# Patient Record
Sex: MALE | Race: BLACK OR AFRICAN AMERICAN | NOT HISPANIC OR LATINO | Employment: OTHER | URBAN - METROPOLITAN AREA
[De-identification: names, ages, dates, MRNs, and addresses within clinical notes are randomized per-mention and may not be internally consistent; named-entity substitution may affect disease eponyms.]

---

## 2023-04-06 ENCOUNTER — APPOINTMENT (OUTPATIENT)
Dept: PRIMARY CARE | Facility: CLINIC | Age: 73
End: 2023-04-06
Payer: MEDICARE

## 2023-04-11 DIAGNOSIS — E78.00 PURE HYPERCHOLESTEROLEMIA: ICD-10-CM

## 2023-04-11 DIAGNOSIS — I47.19 ATRIAL TACHYCARDIA (CMS-HCC): ICD-10-CM

## 2023-04-11 DIAGNOSIS — E11.9 TYPE 2 DIABETES MELLITUS WITHOUT COMPLICATION, WITHOUT LONG-TERM CURRENT USE OF INSULIN (MULTI): Primary | ICD-10-CM

## 2023-04-11 DIAGNOSIS — I10 BENIGN ESSENTIAL HYPERTENSION: ICD-10-CM

## 2023-04-12 PROBLEM — J45.909 ASTHMA (HHS-HCC): Status: ACTIVE | Noted: 2023-04-12

## 2023-04-12 PROBLEM — J44.9 COPD (CHRONIC OBSTRUCTIVE PULMONARY DISEASE) (MULTI): Status: ACTIVE | Noted: 2023-04-12

## 2023-04-12 PROBLEM — I10 BENIGN ESSENTIAL HYPERTENSION: Status: ACTIVE | Noted: 2023-04-12

## 2023-04-12 PROBLEM — R06.09 DOE (DYSPNEA ON EXERTION): Status: ACTIVE | Noted: 2023-04-12

## 2023-04-12 PROBLEM — R06.9 ABNORMAL RESPIRATIONS: Status: ACTIVE | Noted: 2023-04-12

## 2023-04-12 PROBLEM — I48.91 NEW ONSET ATRIAL FIBRILLATION (MULTI): Status: ACTIVE | Noted: 2023-04-12

## 2023-04-12 PROBLEM — R10.30 LOWER ABDOMINAL PAIN: Status: ACTIVE | Noted: 2023-04-12

## 2023-04-12 PROBLEM — R53.1 WEAKNESS GENERALIZED: Status: ACTIVE | Noted: 2023-04-12

## 2023-04-12 PROBLEM — M21.371 RIGHT FOOT DROP: Status: ACTIVE | Noted: 2023-04-12

## 2023-04-12 PROBLEM — I73.9 PERIPHERAL VASCULAR DISEASE (CMS-HCC): Status: ACTIVE | Noted: 2023-04-12

## 2023-04-12 PROBLEM — I47.10 PAROXYSMAL SVT (SUPRAVENTRICULAR TACHYCARDIA) (CMS-HCC): Status: ACTIVE | Noted: 2023-04-12

## 2023-04-12 PROBLEM — R41.3 MEMORY LOSS: Status: ACTIVE | Noted: 2023-04-12

## 2023-04-12 PROBLEM — I47.19 ATRIAL TACHYCARDIA (CMS-HCC): Status: ACTIVE | Noted: 2023-04-12

## 2023-04-12 PROBLEM — F39 MOOD DISORDER (CMS-HCC): Status: ACTIVE | Noted: 2023-04-12

## 2023-04-12 PROBLEM — L84 CALLUS OF FOOT: Status: ACTIVE | Noted: 2023-04-12

## 2023-04-12 PROBLEM — E78.00 PURE HYPERCHOLESTEROLEMIA: Status: ACTIVE | Noted: 2023-04-12

## 2023-04-12 PROBLEM — D64.9 ANEMIA: Status: ACTIVE | Noted: 2023-04-12

## 2023-04-12 PROBLEM — I48.0 PAROXYSMAL ATRIAL FIBRILLATION (MULTI): Status: ACTIVE | Noted: 2023-04-12

## 2023-04-12 PROBLEM — R79.89 D-DIMER, ELEVATED: Status: ACTIVE | Noted: 2023-04-12

## 2023-04-12 PROBLEM — F32.A DEPRESSION: Status: ACTIVE | Noted: 2023-04-12

## 2023-04-12 PROBLEM — R41.3 MEMORY LOSS, SHORT TERM: Status: ACTIVE | Noted: 2023-04-12

## 2023-04-12 PROBLEM — E11.9 DIABETES MELLITUS (MULTI): Status: ACTIVE | Noted: 2023-04-12

## 2023-04-12 PROBLEM — G31.84 MILD COGNITIVE IMPAIRMENT: Status: ACTIVE | Noted: 2023-04-12

## 2023-04-12 PROBLEM — R93.89 ABNORMAL CHEST CT: Status: ACTIVE | Noted: 2023-04-12

## 2023-04-12 PROBLEM — R41.89 COGNITIVE CHANGES: Status: ACTIVE | Noted: 2023-04-12

## 2023-04-12 PROBLEM — R05.3 CHRONIC COUGH: Status: ACTIVE | Noted: 2023-04-12

## 2023-04-12 PROBLEM — N52.9 ORGANIC IMPOTENCE: Status: ACTIVE | Noted: 2023-04-12

## 2023-04-12 PROBLEM — U07.1 COVID-19 VIRUS INFECTION: Status: ACTIVE | Noted: 2023-04-12

## 2023-04-12 RX ORDER — IBUPROFEN 200 MG
CAPSULE ORAL
COMMUNITY
Start: 2022-02-03 | End: 2023-10-10 | Stop reason: SDUPTHER

## 2023-04-12 RX ORDER — METOPROLOL SUCCINATE 25 MG/1
1 TABLET, EXTENDED RELEASE ORAL DAILY
COMMUNITY
Start: 2017-11-04 | End: 2023-04-13 | Stop reason: SDUPTHER

## 2023-04-12 RX ORDER — LISINOPRIL AND HYDROCHLOROTHIAZIDE 12.5; 2 MG/1; MG/1
1 TABLET ORAL DAILY
COMMUNITY
Start: 2012-12-20 | End: 2023-04-13 | Stop reason: SDUPTHER

## 2023-04-12 RX ORDER — INSULIN LISPRO 100 [IU]/ML
INJECTION, SOLUTION INTRAVENOUS; SUBCUTANEOUS
COMMUNITY
Start: 2022-02-03

## 2023-04-12 RX ORDER — GLIMEPIRIDE 1 MG/1
1 TABLET ORAL DAILY
COMMUNITY
Start: 2012-12-20 | End: 2023-04-13 | Stop reason: SDUPTHER

## 2023-04-12 RX ORDER — CLOPIDOGREL BISULFATE 75 MG/1
1 TABLET ORAL DAILY
COMMUNITY
Start: 2012-12-20 | End: 2023-04-13 | Stop reason: SDUPTHER

## 2023-04-12 RX ORDER — ATORVASTATIN CALCIUM 40 MG/1
1 TABLET, FILM COATED ORAL NIGHTLY
COMMUNITY
Start: 2016-10-14 | End: 2023-04-13 | Stop reason: SDUPTHER

## 2023-04-12 RX ORDER — ALBUTEROL SULFATE 90 UG/1
AEROSOL, METERED RESPIRATORY (INHALATION)
COMMUNITY
Start: 2022-02-03 | End: 2023-10-10 | Stop reason: SDUPTHER

## 2023-04-12 RX ORDER — TIOTROPIUM BROMIDE INHALATION SPRAY 1.56 UG/1
SPRAY, METERED RESPIRATORY (INHALATION)
COMMUNITY
Start: 2022-02-17 | End: 2023-10-10 | Stop reason: SDUPTHER

## 2023-04-13 RX ORDER — METOPROLOL SUCCINATE 25 MG/1
TABLET, EXTENDED RELEASE ORAL
Qty: 90 TABLET | Refills: 3 | Status: SHIPPED | OUTPATIENT
Start: 2023-04-13 | End: 2023-10-10 | Stop reason: SDUPTHER

## 2023-04-13 RX ORDER — GLIMEPIRIDE 1 MG/1
TABLET ORAL
Qty: 90 TABLET | Refills: 3 | Status: SHIPPED | OUTPATIENT
Start: 2023-04-13 | End: 2023-10-10 | Stop reason: SDUPTHER

## 2023-04-13 RX ORDER — LISINOPRIL AND HYDROCHLOROTHIAZIDE 12.5; 2 MG/1; MG/1
TABLET ORAL
Qty: 90 TABLET | Refills: 3 | Status: SHIPPED | OUTPATIENT
Start: 2023-04-13 | End: 2023-10-10 | Stop reason: SDUPTHER

## 2023-04-13 RX ORDER — ATORVASTATIN CALCIUM 40 MG/1
TABLET, FILM COATED ORAL
Qty: 90 TABLET | Refills: 3 | Status: SHIPPED | OUTPATIENT
Start: 2023-04-13 | End: 2023-10-10 | Stop reason: SDUPTHER

## 2023-04-13 RX ORDER — CLOPIDOGREL BISULFATE 75 MG/1
TABLET ORAL
Qty: 90 TABLET | Refills: 3 | Status: SHIPPED | OUTPATIENT
Start: 2023-04-13 | End: 2023-10-10 | Stop reason: SDUPTHER

## 2023-04-20 ENCOUNTER — OFFICE VISIT (OUTPATIENT)
Dept: PRIMARY CARE | Facility: CLINIC | Age: 73
End: 2023-04-20
Payer: MEDICARE

## 2023-04-20 ENCOUNTER — LAB (OUTPATIENT)
Dept: LAB | Facility: LAB | Age: 73
End: 2023-04-20
Payer: MEDICARE

## 2023-04-20 VITALS — WEIGHT: 228 LBS | BODY MASS INDEX: 30.92 KG/M2

## 2023-04-20 DIAGNOSIS — E11.9 TYPE 2 DIABETES MELLITUS WITHOUT COMPLICATION, WITHOUT LONG-TERM CURRENT USE OF INSULIN (MULTI): ICD-10-CM

## 2023-04-20 DIAGNOSIS — J43.9 PULMONARY EMPHYSEMA, UNSPECIFIED EMPHYSEMA TYPE (MULTI): ICD-10-CM

## 2023-04-20 DIAGNOSIS — I10 BENIGN ESSENTIAL HYPERTENSION: ICD-10-CM

## 2023-04-20 DIAGNOSIS — E78.00 PURE HYPERCHOLESTEROLEMIA: ICD-10-CM

## 2023-04-20 DIAGNOSIS — E11.9 TYPE 2 DIABETES MELLITUS WITHOUT COMPLICATION, WITHOUT LONG-TERM CURRENT USE OF INSULIN (MULTI): Primary | ICD-10-CM

## 2023-04-20 LAB
ALBUMIN (G/DL) IN SER/PLAS: 3.8 G/DL (ref 3.4–5)
ANION GAP IN SER/PLAS: 13 MMOL/L (ref 10–20)
CALCIUM (MG/DL) IN SER/PLAS: 9.2 MG/DL (ref 8.6–10.6)
CARBON DIOXIDE, TOTAL (MMOL/L) IN SER/PLAS: 22 MMOL/L (ref 21–32)
CHLORIDE (MMOL/L) IN SER/PLAS: 106 MMOL/L (ref 98–107)
CHOLESTEROL (MG/DL) IN SER/PLAS: 266 MG/DL (ref 0–199)
CHOLESTEROL IN HDL (MG/DL) IN SER/PLAS: 46.6 MG/DL
CHOLESTEROL/HDL RATIO: 5.7
CREATININE (MG/DL) IN SER/PLAS: 1.39 MG/DL (ref 0.5–1.3)
ERYTHROCYTE DISTRIBUTION WIDTH (RATIO) BY AUTOMATED COUNT: 15 % (ref 11.5–14.5)
ERYTHROCYTE MEAN CORPUSCULAR HEMOGLOBIN CONCENTRATION (G/DL) BY AUTOMATED: 33.8 G/DL (ref 32–36)
ERYTHROCYTE MEAN CORPUSCULAR VOLUME (FL) BY AUTOMATED COUNT: 87 FL (ref 80–100)
ERYTHROCYTES (10*6/UL) IN BLOOD BY AUTOMATED COUNT: 5.19 X10E12/L (ref 4.5–5.9)
ESTIMATED AVERAGE GLUCOSE FOR HBA1C: 146 MG/DL
GFR MALE: 54 ML/MIN/1.73M2
GLUCOSE (MG/DL) IN SER/PLAS: 138 MG/DL (ref 74–99)
HEMATOCRIT (%) IN BLOOD BY AUTOMATED COUNT: 45 % (ref 41–52)
HEMOGLOBIN (G/DL) IN BLOOD: 15.2 G/DL (ref 13.5–17.5)
HEMOGLOBIN A1C/HEMOGLOBIN TOTAL IN BLOOD: 6.7 %
LDL: 198 MG/DL (ref 0–99)
LEUKOCYTES (10*3/UL) IN BLOOD BY AUTOMATED COUNT: 6.4 X10E9/L (ref 4.4–11.3)
MAGNESIUM (MG/DL) IN SER/PLAS: 1.9 MG/DL (ref 1.6–2.4)
NRBC (PER 100 WBCS) BY AUTOMATED COUNT: 0 /100 WBC (ref 0–0)
PHOSPHATE (MG/DL) IN SER/PLAS: 2.7 MG/DL (ref 2.5–4.9)
PLATELETS (10*3/UL) IN BLOOD AUTOMATED COUNT: 269 X10E9/L (ref 150–450)
POTASSIUM (MMOL/L) IN SER/PLAS: 4.4 MMOL/L (ref 3.5–5.3)
SODIUM (MMOL/L) IN SER/PLAS: 137 MMOL/L (ref 136–145)
THYROTROPIN (MIU/L) IN SER/PLAS BY DETECTION LIMIT <= 0.05 MIU/L: 1.24 MIU/L (ref 0.44–3.98)
TRIGLYCERIDE (MG/DL) IN SER/PLAS: 109 MG/DL (ref 0–149)
UREA NITROGEN (MG/DL) IN SER/PLAS: 15 MG/DL (ref 6–23)
VLDL: 22 MG/DL (ref 0–40)

## 2023-04-20 PROCEDURE — 99214 OFFICE O/P EST MOD 30 MIN: CPT | Performed by: INTERNAL MEDICINE

## 2023-04-20 PROCEDURE — 36415 COLL VENOUS BLD VENIPUNCTURE: CPT

## 2023-04-20 PROCEDURE — 80061 LIPID PANEL: CPT

## 2023-04-20 PROCEDURE — 80069 RENAL FUNCTION PANEL: CPT

## 2023-04-20 PROCEDURE — 83036 HEMOGLOBIN GLYCOSYLATED A1C: CPT

## 2023-04-20 PROCEDURE — 83735 ASSAY OF MAGNESIUM: CPT

## 2023-04-20 PROCEDURE — 85027 COMPLETE CBC AUTOMATED: CPT

## 2023-04-20 PROCEDURE — 84443 ASSAY THYROID STIM HORMONE: CPT

## 2023-04-20 ASSESSMENT — ENCOUNTER SYMPTOMS: SHORTNESS OF BREATH: 1

## 2023-04-20 NOTE — ASSESSMENT & PLAN NOTE
#IDDM-II  - A1c pending, last 6.8%  - Instructed patient to continue insulin lispro and glimepiride 1 mg tab daily

## 2023-04-20 NOTE — PROGRESS NOTES
Subjective   Patient ID: Brendan Rob is a 72 y.o. male who presents for Follow-up.    Brendan Rob is a 71 y/o AA M with PMHx significant for HTN, COPD, Atrial Fibrillation, NIDDM-II, HLD, and Mild Cognitive Impairment who is presenting for follow-up visit. Patient reports feeling SOB at times when going for walks but has not had to use his rescue inhaler more than usual (1-3x per day). He at times forgets to take his blood pressure medications, but wife who was at bedside says it does not happen often. Patient states to feel well and has not had any illnesses or issues aside from SOB. He denies HA, vision changes, dizziness, falls, CP, N/V/D, fever, chills, weight changes.         Review of Systems   Respiratory:  Positive for shortness of breath.    All other systems reviewed and are negative.      Objective   Wt 103 kg (228 lb)   BMI 30.92 kg/m²     Physical Exam  Vitals reviewed.   Constitutional:       Appearance: Normal appearance. He is normal weight.   HENT:      Head: Normocephalic and atraumatic.      Mouth/Throat:      Mouth: Mucous membranes are moist.   Eyes:      Extraocular Movements: Extraocular movements intact.      Conjunctiva/sclera: Conjunctivae normal.      Pupils: Pupils are equal, round, and reactive to light.   Cardiovascular:      Rate and Rhythm: Normal rate and regular rhythm.   Pulmonary:      Effort: Pulmonary effort is normal.      Breath sounds: Normal breath sounds.   Abdominal:      General: Bowel sounds are normal.      Palpations: Abdomen is soft.   Musculoskeletal:         General: Normal range of motion.   Skin:     General: Skin is warm.   Neurological:      General: No focal deficit present.      Mental Status: He is alert.   Psychiatric:         Mood and Affect: Mood normal.         Behavior: Behavior normal.         Thought Content: Thought content normal.         Judgment: Judgment normal.         Assessment/Plan   Problem List Items Addressed This Visit           Respiratory    COPD (chronic obstructive pulmonary disease) (CMS/[facility])     #COPD  - Continue albuterol MDI PRN + Spiriva            Circulatory    Benign essential hypertension     #HTN  - /76 on manual BP, he had not taken his BP meds  - Continue Metoprolol succinate 25 mg tab daily  - Continue Lisinopril-hydrochlorothiazide 20-12.5 mg tab daily         Relevant Orders    Renal function panel    Magnesium    CBC    Tsh With Reflex To Free T4 If Abnormal       Endocrine/Metabolic    Diabetes mellitus (CMS/[facility]) - Primary     #IDDM-II  - A1c pending, last 6.8%  - Instructed patient to continue insulin lispro and glimepiride 1 mg tab daily         Relevant Orders    Hemoglobin A1c    Renal function panel       Other    Pure hypercholesterolemia     #HLD  - Lipid panel pending  - Continue atorvastatin 40 mg tab         Relevant Orders    Lipid panel

## 2023-04-20 NOTE — ASSESSMENT & PLAN NOTE
#HTN  - /76 on manual BP, he had not taken his BP meds  - Continue Metoprolol succinate 25 mg tab daily  - Continue Lisinopril-hydrochlorothiazide 20-12.5 mg tab daily

## 2023-09-20 PROBLEM — J96.10 CHRONIC RESPIRATORY FAILURE (MULTI): Status: ACTIVE | Noted: 2023-09-20

## 2023-09-28 ENCOUNTER — APPOINTMENT (OUTPATIENT)
Dept: PRIMARY CARE | Facility: CLINIC | Age: 73
End: 2023-09-28
Payer: MEDICARE

## 2023-10-10 ENCOUNTER — LAB (OUTPATIENT)
Dept: LAB | Facility: LAB | Age: 73
End: 2023-10-10
Payer: MEDICARE

## 2023-10-10 ENCOUNTER — OFFICE VISIT (OUTPATIENT)
Dept: PRIMARY CARE | Facility: CLINIC | Age: 73
End: 2023-10-10
Payer: MEDICARE

## 2023-10-10 VITALS — DIASTOLIC BLOOD PRESSURE: 76 MMHG | SYSTOLIC BLOOD PRESSURE: 130 MMHG | WEIGHT: 219 LBS | BODY MASS INDEX: 29.7 KG/M2

## 2023-10-10 DIAGNOSIS — E11.9 TYPE 2 DIABETES MELLITUS WITHOUT COMPLICATION, WITHOUT LONG-TERM CURRENT USE OF INSULIN (MULTI): ICD-10-CM

## 2023-10-10 DIAGNOSIS — E78.00 PURE HYPERCHOLESTEROLEMIA: ICD-10-CM

## 2023-10-10 DIAGNOSIS — I10 BENIGN ESSENTIAL HYPERTENSION: ICD-10-CM

## 2023-10-10 DIAGNOSIS — I47.19 ATRIAL TACHYCARDIA (CMS-HCC): ICD-10-CM

## 2023-10-10 DIAGNOSIS — Z00.00 ROUTINE MEDICAL EXAM: Primary | ICD-10-CM

## 2023-10-10 DIAGNOSIS — R41.3 MEMORY CHANGE: ICD-10-CM

## 2023-10-10 DIAGNOSIS — J42 CHRONIC BRONCHITIS, UNSPECIFIED CHRONIC BRONCHITIS TYPE (MULTI): ICD-10-CM

## 2023-10-10 LAB
ALBUMIN SERPL BCP-MCNC: 4.1 G/DL (ref 3.4–5)
ALP SERPL-CCNC: 73 U/L (ref 33–136)
ALT SERPL W P-5'-P-CCNC: 15 U/L (ref 10–52)
ANION GAP SERPL CALC-SCNC: 16 MMOL/L (ref 10–20)
AST SERPL W P-5'-P-CCNC: 23 U/L (ref 9–39)
BILIRUB SERPL-MCNC: 0.7 MG/DL (ref 0–1.2)
BUN SERPL-MCNC: 22 MG/DL (ref 6–23)
CALCIUM SERPL-MCNC: 9.2 MG/DL (ref 8.6–10.6)
CHLORIDE SERPL-SCNC: 109 MMOL/L (ref 98–107)
CO2 SERPL-SCNC: 21 MMOL/L (ref 21–32)
CREAT SERPL-MCNC: 1.71 MG/DL (ref 0.5–1.3)
ERYTHROCYTE [DISTWIDTH] IN BLOOD BY AUTOMATED COUNT: 14.9 % (ref 11.5–14.5)
GFR SERPL CREATININE-BSD FRML MDRD: 42 ML/MIN/1.73M*2
GLUCOSE SERPL-MCNC: 93 MG/DL (ref 74–99)
HCT VFR BLD AUTO: 42.5 % (ref 41–52)
HGB BLD-MCNC: 14.2 G/DL (ref 13.5–17.5)
MCH RBC QN AUTO: 30 PG (ref 26–34)
MCHC RBC AUTO-ENTMCNC: 33.4 G/DL (ref 32–36)
MCV RBC AUTO: 90 FL (ref 80–100)
NRBC BLD-RTO: 0 /100 WBCS (ref 0–0)
PLATELET # BLD AUTO: 311 X10*3/UL (ref 150–450)
PMV BLD AUTO: 10.8 FL (ref 7.5–11.5)
POTASSIUM SERPL-SCNC: 5 MMOL/L (ref 3.5–5.3)
PROT SERPL-MCNC: 6.6 G/DL (ref 6.4–8.2)
RBC # BLD AUTO: 4.74 X10*6/UL (ref 4.5–5.9)
SODIUM SERPL-SCNC: 141 MMOL/L (ref 136–145)
WBC # BLD AUTO: 9 X10*3/UL (ref 4.4–11.3)

## 2023-10-10 PROCEDURE — 1170F FXNL STATUS ASSESSED: CPT | Performed by: INTERNAL MEDICINE

## 2023-10-10 PROCEDURE — 1126F AMNT PAIN NOTED NONE PRSNT: CPT | Performed by: INTERNAL MEDICINE

## 2023-10-10 PROCEDURE — 99214 OFFICE O/P EST MOD 30 MIN: CPT | Performed by: INTERNAL MEDICINE

## 2023-10-10 PROCEDURE — G0439 PPPS, SUBSEQ VISIT: HCPCS | Performed by: INTERNAL MEDICINE

## 2023-10-10 PROCEDURE — 1159F MED LIST DOCD IN RCRD: CPT | Performed by: INTERNAL MEDICINE

## 2023-10-10 PROCEDURE — 1160F RVW MEDS BY RX/DR IN RCRD: CPT | Performed by: INTERNAL MEDICINE

## 2023-10-10 PROCEDURE — 1036F TOBACCO NON-USER: CPT | Performed by: INTERNAL MEDICINE

## 2023-10-10 PROCEDURE — 36415 COLL VENOUS BLD VENIPUNCTURE: CPT

## 2023-10-10 PROCEDURE — 85027 COMPLETE CBC AUTOMATED: CPT

## 2023-10-10 PROCEDURE — 3075F SYST BP GE 130 - 139MM HG: CPT | Performed by: INTERNAL MEDICINE

## 2023-10-10 PROCEDURE — 80053 COMPREHEN METABOLIC PANEL: CPT

## 2023-10-10 PROCEDURE — 3044F HG A1C LEVEL LT 7.0%: CPT | Performed by: INTERNAL MEDICINE

## 2023-10-10 PROCEDURE — 3078F DIAST BP <80 MM HG: CPT | Performed by: INTERNAL MEDICINE

## 2023-10-10 RX ORDER — TIOTROPIUM BROMIDE INHALATION SPRAY 1.56 UG/1
1 SPRAY, METERED RESPIRATORY (INHALATION) 2 TIMES DAILY
Qty: 1 EACH | Refills: 2 | Status: SHIPPED | OUTPATIENT
Start: 2023-10-10 | End: 2024-03-18 | Stop reason: WASHOUT

## 2023-10-10 RX ORDER — CLOPIDOGREL BISULFATE 75 MG/1
75 TABLET ORAL DAILY
Qty: 30 TABLET | Refills: 0 | Status: SHIPPED | OUTPATIENT
Start: 2023-10-10 | End: 2023-10-31 | Stop reason: SDUPTHER

## 2023-10-10 RX ORDER — GLIMEPIRIDE 1 MG/1
1 TABLET ORAL DAILY
Qty: 90 TABLET | Refills: 3 | Status: SHIPPED | OUTPATIENT
Start: 2023-10-10

## 2023-10-10 RX ORDER — ALBUTEROL SULFATE 90 UG/1
2 AEROSOL, METERED RESPIRATORY (INHALATION) EVERY 6 HOURS PRN
Qty: 18 G | Refills: 3 | Status: SHIPPED | OUTPATIENT
Start: 2023-10-10 | End: 2024-03-01 | Stop reason: SDUPTHER

## 2023-10-10 RX ORDER — METOPROLOL SUCCINATE 25 MG/1
25 TABLET, EXTENDED RELEASE ORAL DAILY
Qty: 90 TABLET | Refills: 3 | Status: SHIPPED | OUTPATIENT
Start: 2023-10-10

## 2023-10-10 RX ORDER — LISINOPRIL AND HYDROCHLOROTHIAZIDE 12.5; 2 MG/1; MG/1
1 TABLET ORAL DAILY
Qty: 90 TABLET | Refills: 3 | Status: SHIPPED | OUTPATIENT
Start: 2023-10-10

## 2023-10-10 RX ORDER — ATORVASTATIN CALCIUM 40 MG/1
40 TABLET, FILM COATED ORAL NIGHTLY
Qty: 90 TABLET | Refills: 3 | Status: SHIPPED | OUTPATIENT
Start: 2023-10-10 | End: 2024-04-23 | Stop reason: SDUPTHER

## 2023-10-10 RX ORDER — IBUPROFEN 200 MG
CAPSULE ORAL
Qty: 30 STRIP | Refills: 2 | Status: SHIPPED | OUTPATIENT
Start: 2023-10-10

## 2023-10-10 ASSESSMENT — PATIENT HEALTH QUESTIONNAIRE - PHQ9
1. LITTLE INTEREST OR PLEASURE IN DOING THINGS: NOT AT ALL
2. FEELING DOWN, DEPRESSED OR HOPELESS: NOT AT ALL
SUM OF ALL RESPONSES TO PHQ9 QUESTIONS 1 AND 2: 0

## 2023-10-10 ASSESSMENT — ACTIVITIES OF DAILY LIVING (ADL)
DOING_HOUSEWORK: INDEPENDENT
TAKING_MEDICATION: INDEPENDENT
DRESSING: INDEPENDENT
MANAGING_FINANCES: INDEPENDENT
GROCERY_SHOPPING: INDEPENDENT
BATHING: INDEPENDENT

## 2023-10-10 ASSESSMENT — ENCOUNTER SYMPTOMS
DEPRESSION: 0
OCCASIONAL FEELINGS OF UNSTEADINESS: 0
LOSS OF SENSATION IN FEET: 0

## 2023-10-10 ASSESSMENT — PAIN SCALES - GENERAL: PAINLEVEL: 0-NO PAIN

## 2023-10-10 NOTE — PROGRESS NOTES
Primary care office Note    Subjective:   Chief complain:   Chief Complaint   Patient presents with    Medicare Annual Wellness Visit Subsequent     Refills optum        Brendan Rob is a 72 y.o. male who presented to the clinic today for routine follow up visit and medicare wellness visit.   Medicare Wellness Visit Billing Compliance Not Met    *This is a visual tool to show completion of required items on the day of the visit. Green checks will only appear on the date of visit.    Review all medications by prescribing practitioner or clinical pharmacist (such as prescriptions, OTCs, herbal therapies and supplements) documented in the medical record     Past Medical, Surgical, and Family History reviewed and updated in chart    Tobacco Use Reviewed    Alcohol Use Reviewed    Illicit Drug Use Reviewed    PHQ2/9     Falls in Last Year Reviewed    Home Safety Risk Factors Reviewed     Cognitive Impairment Reviewed     Patient Self Assessment and Health Status     Current Diet Reviewed     Exercise Frequency     ADL - Hearing Impairment     ADL - Bathing     ADL - Dressing     ADL - Walks in Home     IADL - Managing Finances     IADL - Grocery Shopping     IADL - Taking Medications     IADL - Doing Housework       HPI:   Brendan Rob  is a 72 y.o. male with a medical of COPD, HTN, DM, HLD who presented to the clinic today for a routine follow up visit and medicare wellness visit.    He has no new complains today.     The patient denies headaches, lightheadedness, changes in speech/vision, photophobia, dysphagia, diaphoresis, Chest pain, dyspnea, Abdominal pain, recent falls or trauma, and changes in bowel/urinary habits.     ROS:   12 points review of system is negative excepted as stated above in the HPI.    PMH:    has a past medical history of Abnormal weight loss (04/25/2015), Acute upper respiratory infection, unspecified (01/04/2014), Encounter for immunization (09/18/2020), Encounter for screening  for malignant neoplasm of prostate (04/25/2015), Encounter for screening for malignant neoplasm of prostate (06/29/2013), Personal history of other diseases of urinary system (06/29/2013), and Personal history of other endocrine, nutritional and metabolic disease.     Allergies: No Known Allergies     MEDS:   Current Outpatient Medications   Medication Instructions    albuterol 90 mcg/actuation inhaler 2 puffs, inhalation, Every 6 hours PRN    atorvastatin (LIPITOR) 40 mg, oral, Nightly    blood sugar diagnostic (Blood Glucose Test) strip USE AS DIRECTED.    clopidogrel (PLAVIX) 75 mg, oral, Daily    glimepiride (AMARYL) 1 mg, oral, Daily    insulin lispro (HumaLOG) 100 unit/mL injection injectable 3 times a day    lisinopriL-hydrochlorothiazide 20-12.5 mg tablet 1 tablet, oral, Daily    metoprolol succinate XL (TOPROL-XL) 25 mg, oral, Daily, Do not crush or chew.    tiotropium (Spiriva Respimat) 1.25 mcg/actuation inhaler 1 puff, inhalation, 2 times daily        Surgical Hx:   Past Surgical History:   Procedure Laterality Date    COLONOSCOPY  03/08/2013    Complete Colonoscopy    OTHER SURGICAL HISTORY  11/01/2019    Aortobifemoral bypass        Objective:   Vital Signs:   Visit Vitals  /76        Physical Examination:   GE; sitting comfortably in a chair, in NAD  HEENT; AT/NC, no conjunctival pallor, anicteric sclera  Neck; Supple neck, no LAD/JVD  Chest; CTAbl, no adventitious sounds  CVS; s1 and s2 only no MGR, RRR  Abd; soft, NT/ND, normoactive BS  Ext; no cyanosis/clubbing/edema, pulses intact  Neuro; Aox3  Psych; normal mood     Labs:   CBC:   WBC   Date Value Ref Range Status   10/10/2023 9.0 4.4 - 11.3 x10*3/uL Final   04/20/2023 6.4 4.4 - 11.3 x10E9/L Final   12/22/2022 8.2 4.4 - 11.3 x10E9/L Final     Hemoglobin   Date Value Ref Range Status   10/10/2023 14.2 13.5 - 17.5 g/dL Final   04/20/2023 15.2 13.5 - 17.5 g/dL Final   12/22/2022 15.5 13.5 - 17.5 g/dL Final     MCV   Date Value Ref Range Status    10/10/2023 90 80 - 100 fL Final   04/20/2023 87 80 - 100 fL Final   12/22/2022 85 80 - 100 fL Final     Platelets   Date Value Ref Range Status   10/10/2023 311 150 - 450 x10*3/uL Final   04/20/2023 269 150 - 450 x10E9/L Final   12/22/2022 281 150 - 450 x10E9/L Final        CMP:   Sodium   Date Value Ref Range Status   10/10/2023 141 136 - 145 mmol/L Final   04/20/2023 137 136 - 145 mmol/L Final   12/22/2022 135 (L) 136 - 145 mmol/L Final     Potassium   Date Value Ref Range Status   10/10/2023 5.0 3.5 - 5.3 mmol/L Final   04/20/2023 4.4 3.5 - 5.3 mmol/L Final   12/22/2022 4.4 3.5 - 5.3 mmol/L Final     Chloride   Date Value Ref Range Status   10/10/2023 109 (H) 98 - 107 mmol/L Final   04/20/2023 106 98 - 107 mmol/L Final   12/22/2022 104 98 - 107 mmol/L Final     Urea Nitrogen   Date Value Ref Range Status   10/10/2023 22 6 - 23 mg/dL Final   04/20/2023 15 6 - 23 mg/dL Final   12/22/2022 15 6 - 23 mg/dL Final     Creatinine   Date Value Ref Range Status   10/10/2023 1.71 (H) 0.50 - 1.30 mg/dL Final   04/20/2023 1.39 (H) 0.50 - 1.30 mg/dL Final   12/22/2022 1.35 (H) 0.50 - 1.30 mg/dL Final     eGFR   Date Value Ref Range Status   10/10/2023 42 (L) >60 mL/min/1.73m*2 Final     Comment:     Calculations of estimated GFR are performed using the 2021 CKD-EPI Study Refit equation without the race variable for the IDMS-Traceable creatinine methods.  https://jasn.asnjournals.org/content/early/2021/09/22/ASN.5147344263      A1c:   Hemoglobin A1C   Date Value Ref Range Status   04/20/2023 6.7 (A) % Final     Comment:          Diagnosis of Diabetes-Adults   Non-Diabetic: < or = 5.6%   Increased risk for developing diabetes: 5.7-6.4%   Diagnostic of diabetes: > or = 6.5%  .       Monitoring of Diabetes                Age (y)     Therapeutic Goal (%)   Adults:          >18           <7.0   Pediatrics:    13-18           <7.5                   7-12           <8.0                   0- 6            7.5-8.5   American Diabetes  "Association. Diabetes Care 33(S1), Jan 2010.   Hemoglobin variant detected which does not interfere    with determination of Hemoglobin A1c. Hemoglobin   identification can be ordered to characterize the variant   if clinically indicated.   02/01/2022 6.8 (A) % Final     Comment:          Diagnosis of Diabetes-Adults   Non-Diabetic: < or = 5.6%   Increased risk for developing diabetes: 5.7-6.4%   Diagnostic of diabetes: > or = 6.5%  .       Monitoring of Diabetes                Age (y)     Therapeutic Goal (%)   Adults:          >18           <7.0   Pediatrics:    13-18           <7.5                   7-12           <8.0                   0- 6            7.5-8.5   American Diabetes Association. Diabetes Care 33(S1), Jan 2010.   Hemoglobin variant detected which does not interfere    with determination of Hemoglobin A1c. Hemoglobin   identification can be ordered to characterize the variant   if clinically indicated.          Other labs;   Vit D:   No results found for: \"VITD25\"   TSH:  TSH   Date Value Ref Range Status   04/20/2023 1.24 0.44 - 3.98 mIU/L Final     Comment:      TSH testing is performed using different testing    methodology at [facility] than at other    [facility]. Direct result comparisons should    only be made within the same method.   01/11/2022 0.44 0.44 - 3.98 mIU/L Final     Comment:      TSH testing is performed using different testing    methodology at [facility] than at other    [facility]. Direct result comparisons should    only be made within the same method.     07/20/2019 0.86 0.44 - 3.98 mIU/L Final     Comment:      TSH testing is performed using different testing    methodology at [facility] than at other    [facility]. Direct result comparisons should    only be made within the same method.  .   Patients receiving more than 5 mg/day of biotin may have interference   in test results.  A sample should be taken no sooner " than eight hours   after  previous dose. Contact [phone] for additional information.          Assessment and plan:   Problem List Items Addressed This Visit       Atrial tachycardia    Relevant Medications    clopidogrel (Plavix) 75 mg tablet    metoprolol succinate XL (Toprol-XL) 25 mg 24 hr tablet    Benign essential hypertension    Relevant Medications    lisinopriL-hydrochlorothiazide 20-12.5 mg tablet    COPD (chronic obstructive pulmonary disease) (CMS/[facility])    Relevant Medications    albuterol 90 mcg/actuation inhaler    tiotropium (Spiriva Respimat) 1.25 mcg/actuation inhaler    Diabetes mellitus (CMS/[facility])    Relevant Medications    glimepiride (Amaryl) 1 mg tablet    blood sugar diagnostic (Blood Glucose Test) strip    Other Relevant Orders    Comprehensive metabolic panel (Completed)    CBC (Completed)    Pure hypercholesterolemia    Relevant Medications    atorvastatin (Lipitor) 40 mg tablet     Other Visit Diagnoses       Routine medical exam    -  Primary    Memory change        Relevant Orders    Referral to Neurology         The patient is a 72 y.o. male with a medical of COPD, HTN, DM, HLD who presented to the clinic today for a routine follow up visit and a wellness visit.     DM; last A1c 6.7  Home blood sugar measurement  Continue giimepiride   continue insulin lispro    HTN:   BP today  Home measurement:   Advised low salt diet  Enourage to do exercise and wt loss   continue metoprolol suc 25, continue lisinopril hydrochlorothiazide 20/12.5    HLD; continue Lipitor 50 mg daily, continue plavix 75mg daily     COPD continue Albuterol 90 mcg inhaler    Ordered the following test: cbc, cmp, lipids, A1c  - Refilled all medications today    Anna Arana,

## 2023-10-17 NOTE — TELEPHONE ENCOUNTER
----- Message from Anna Arana DO sent at 10/12/2023  5:04 PM EDT -----  Worsening CKD noted - would like to recheck in 1 month. Please have patient scheduled in person or virtual in 1 month. Thank you :)

## 2023-10-25 ENCOUNTER — OFFICE VISIT (OUTPATIENT)
Dept: CARDIOLOGY | Facility: CLINIC | Age: 73
End: 2023-10-25
Payer: MEDICARE

## 2023-10-25 VITALS
WEIGHT: 219.8 LBS | HEART RATE: 67 BPM | HEIGHT: 72 IN | DIASTOLIC BLOOD PRESSURE: 78 MMHG | OXYGEN SATURATION: 97 % | SYSTOLIC BLOOD PRESSURE: 130 MMHG | BODY MASS INDEX: 29.77 KG/M2

## 2023-10-25 DIAGNOSIS — I47.10 PAROXYSMAL SVT (SUPRAVENTRICULAR TACHYCARDIA) (CMS-HCC): ICD-10-CM

## 2023-10-25 DIAGNOSIS — E78.00 PURE HYPERCHOLESTEROLEMIA: ICD-10-CM

## 2023-10-25 DIAGNOSIS — R06.09 DOE (DYSPNEA ON EXERTION): Primary | ICD-10-CM

## 2023-10-25 DIAGNOSIS — I47.19 ATRIAL TACHYCARDIA (CMS-HCC): ICD-10-CM

## 2023-10-25 DIAGNOSIS — I10 BENIGN ESSENTIAL HYPERTENSION: ICD-10-CM

## 2023-10-25 DIAGNOSIS — I48.91 NEW ONSET ATRIAL FIBRILLATION (MULTI): ICD-10-CM

## 2023-10-25 DIAGNOSIS — I73.9 PERIPHERAL VASCULAR DISEASE (CMS-HCC): ICD-10-CM

## 2023-10-25 DIAGNOSIS — I48.0 PAROXYSMAL ATRIAL FIBRILLATION (MULTI): ICD-10-CM

## 2023-10-25 PROCEDURE — 99214 OFFICE O/P EST MOD 30 MIN: CPT | Performed by: STUDENT IN AN ORGANIZED HEALTH CARE EDUCATION/TRAINING PROGRAM

## 2023-10-25 PROCEDURE — 3044F HG A1C LEVEL LT 7.0%: CPT | Performed by: STUDENT IN AN ORGANIZED HEALTH CARE EDUCATION/TRAINING PROGRAM

## 2023-10-25 PROCEDURE — 1036F TOBACCO NON-USER: CPT | Performed by: STUDENT IN AN ORGANIZED HEALTH CARE EDUCATION/TRAINING PROGRAM

## 2023-10-25 PROCEDURE — 1159F MED LIST DOCD IN RCRD: CPT | Performed by: STUDENT IN AN ORGANIZED HEALTH CARE EDUCATION/TRAINING PROGRAM

## 2023-10-25 PROCEDURE — 1126F AMNT PAIN NOTED NONE PRSNT: CPT | Performed by: STUDENT IN AN ORGANIZED HEALTH CARE EDUCATION/TRAINING PROGRAM

## 2023-10-25 PROCEDURE — 3075F SYST BP GE 130 - 139MM HG: CPT | Performed by: STUDENT IN AN ORGANIZED HEALTH CARE EDUCATION/TRAINING PROGRAM

## 2023-10-25 PROCEDURE — 3078F DIAST BP <80 MM HG: CPT | Performed by: STUDENT IN AN ORGANIZED HEALTH CARE EDUCATION/TRAINING PROGRAM

## 2023-10-25 PROCEDURE — 1160F RVW MEDS BY RX/DR IN RCRD: CPT | Performed by: STUDENT IN AN ORGANIZED HEALTH CARE EDUCATION/TRAINING PROGRAM

## 2023-10-25 ASSESSMENT — ENCOUNTER SYMPTOMS
DEPRESSION: 0
OCCASIONAL FEELINGS OF UNSTEADINESS: 0
LOSS OF SENSATION IN FEET: 0

## 2023-10-25 ASSESSMENT — PAIN SCALES - GENERAL: PAINLEVEL: 0-NO PAIN

## 2023-10-25 NOTE — PROGRESS NOTES
Referred by Dr. Lozano ref. provider found for Follow-up     HPI:    Brendan Rob is a 72 y.o. male with pertinent history of hypertension, type 2 diabetes, Covid in January 2022, history of coronary artery disease, paroxysmal atrial fibrillation (mainly in the setting of COVID; previously on Eliquis; event monitor performed June 2022 with 61 episodes of SVT up to 7 beats including atrial tachycardia but no clear A. fib burden), concern for possible right atrial mass on echo performed 1/11/2022, normal ejection fraction with patient terminating exam early making cardiac MRI unable to assess right atrial mass on 1/12/2022, preserved ejection fraction with impaired relaxation without clear right atrial mass on echo performed 5/19/2022 presents to cardiology clinic for follow-up.     Clinically he is doing well and remains active.  Dyspnea on exertion is stable.  No exacerbating or relieving factors.  Patient denies chest pain and angina.  Pt denies orthopnea, and paroxysmal nocturnal dyspnea.  Pt denies worsening lower extremity edema.  Pt denies palpitations or syncope.  No recent falls.  No fever or chills.  No cough.  No change in bowel or bladder habits.  No sick contacts.  No recent travel.    12 point review of systems including (Constitutional, Eyes, ENMT, Respiratory, Cardiac, Gastrointestinal, Neurological, Psychiatric, and Hematologic) was performed and is otherwise negative.    Past medical history reviewed:   has a past medical history of Abnormal weight loss (04/25/2015), Acute upper respiratory infection, unspecified (01/04/2014), Encounter for immunization (09/18/2020), Encounter for screening for malignant neoplasm of prostate (04/25/2015), Encounter for screening for malignant neoplasm of prostate (06/29/2013), Personal history of other diseases of urinary system (06/29/2013), and Personal history of other endocrine, nutritional and metabolic disease.    Past surgical history reviewed:   has a past  surgical history that includes Colonoscopy (03/08/2013) and Other surgical history (11/01/2019).    Social history reviewed:   reports that he quit smoking about 3 years ago. His smoking use included cigarettes. He started smoking about 27 years ago. He has a 25.00 pack-year smoking history. He has been exposed to tobacco smoke. He has never used smokeless tobacco. He reports that he does not currently use alcohol. He reports that he does not currently use drugs.     Family history reviewed:  No family history on file.    Allergies reviewed: Patient has no known allergies.     Medications reviewed:   Current Outpatient Medications   Medication Instructions    albuterol 90 mcg/actuation inhaler 2 puffs, inhalation, Every 6 hours PRN    atorvastatin (LIPITOR) 40 mg, oral, Nightly    blood sugar diagnostic (Blood Glucose Test) strip USE AS DIRECTED.    clopidogrel (PLAVIX) 75 mg, oral, Daily    glimepiride (AMARYL) 1 mg, oral, Daily    insulin lispro (HumaLOG) 100 unit/mL injection injectable 3 times a day    lisinopriL-hydrochlorothiazide 20-12.5 mg tablet 1 tablet, oral, Daily    metoprolol succinate XL (TOPROL-XL) 25 mg, oral, Daily, Do not crush or chew.    tiotropium (Spiriva Respimat) 1.25 mcg/actuation inhaler 1 puff, inhalation, 2 times daily        Vitals reviewed: Visit Vitals  /78   Pulse 67       Physical Exam:   General:  Patient is awake, alert, and oriented.  Patient is in no acute distress.  HEENT:  Pupils equal and reactive.  Normocephalic.  Moist mucosa.    Neck:  No thyromegaly.  Normal Jugular Venous Pressure.  Cardiovascular:  Regular rate and rhythm.  Normal S1 and S2.  1/6 JEOVANY.  Pulmonary:  Clear to auscultation bilaterally.  Abdomen:  Soft. Non-tender.   Non-distended.  Positive bowel sounds.  Lower Extremities:  2+ pedal pulses. No LE edema.  Neurologic:  Cranial nerves intact.  No focal deficit.   Skin: Skin warm and dry, normal skin turgor.   Psychiatric: Normal affect.    Last  Labs:  CBC -      Lab Results   Component Value Date    WBC 9.0 10/10/2023    HGB 14.2 10/10/2023    HCT 42.5 10/10/2023     10/10/2023        CMP-  Lab Results   Component Value Date    GLUCOSE 93 10/10/2023     10/10/2023    K 5.0 10/10/2023     (H) 10/10/2023    CO2 21 10/10/2023    ANIONGAP 16 10/10/2023    BUN 22 10/10/2023    CREATININE 1.71 (H) 10/10/2023    EGFR 42 (L) 10/10/2023    CALCIUM 9.2 10/10/2023    PHOS 2.7 04/20/2023    PROT 6.6 10/10/2023    ALBUMIN 4.1 10/10/2023    AST 23 10/10/2023    ALT 15 10/10/2023    ALKPHOS 73 10/10/2023    BILITOT 0.7 10/10/2023        LIPIDS-  Lab Results   Component Value Date    CHOL 266 (H) 04/20/2023    TRIG 109 04/20/2023    HDL 46.6 04/20/2023    CHHDL 5.7 (A) 04/20/2023    VLDL 22 04/20/2023        OTHERS-  Lab Results   Component Value Date    HGBA1C 6.7 (A) 04/20/2023    BNP 29 02/18/2022        I personally reviewed the patient's recent vitals, labs, medications, orders, EKGs, pertinent cardiac imaging/ echocardiography.    Assessment and Plan:    Brendan Rob is a 72 y.o. male with pertinent history of hypertension, type 2 diabetes, Covid in January 2022, history of coronary artery disease, paroxysmal atrial fibrillation (mainly in the setting of COVID; previously on Eliquis; event monitor performed June 2022 with 61 episodes of SVT up to 7 beats including atrial tachycardia but no clear A. fib burden), concern for possible right atrial mass on echo performed 1/11/2022, normal ejection fraction with patient terminating exam early making cardiac MRI unable to assess right atrial mass on 1/12/2022, preserved ejection fraction with impaired relaxation without clear right atrial mass on echo performed 5/19/2022 presents to cardiology clinic for follow-up. Clinically he is doing well and remains active.  Dyspnea on exertion is stable.     Please continue current cardiac medications including atorvastatin 40 mg, clopidogrel 75 mg daily,  combination lisinopril 20 mg HCTZ 12.5 mg daily, metoprolol succinate 25 mg daily.    We will obtain a transthoracic echocardiogram for structural evaluation including ejection fraction, assessment of regional wall motion abnormalities or valvular disease, and further evaluation of hemodynamics.    Please followup with me in Cardiology clinic within the next 1 year.  Please return to clinic sooner or seek emergent care if your symptoms reoccur or worsen.    Thank you for allowing me to participate in their care.  Please feel free to call me with any further questions or concerns.        Xavier Sánchez MD, FACC, [facility]  Division of Cardiovascular Medicine  Medical Director, [facility]  [facility]  Assistant Clinical Professor, Medicine  [facility] [employer] [employer] [employer]  [email or web address]  Office:  [phone]

## 2023-10-25 NOTE — PATIENT INSTRUCTIONS
Please continue current cardiac medications including atorvastatin 40 mg, clopidogrel 75 mg daily, combination lisinopril 20 mg HCTZ 12.5 mg daily, metoprolol succinate 25 mg daily.    We will obtain a transthoracic echocardiogram for structural evaluation including ejection fraction, assessment of regional wall motion abnormalities or valvular disease, and further evaluation of hemodynamics.    Please followup with me in Cardiology clinic within the next 1 year.  Please return to clinic sooner or seek emergent care if your symptoms reoccur or worsen.

## 2023-10-31 DIAGNOSIS — I47.19 ATRIAL TACHYCARDIA (CMS-HCC): ICD-10-CM

## 2023-10-31 RX ORDER — CLOPIDOGREL BISULFATE 75 MG/1
75 TABLET ORAL DAILY
Qty: 90 TABLET | Refills: 0 | Status: SHIPPED | OUTPATIENT
Start: 2023-10-31 | End: 2023-12-28 | Stop reason: SDUPTHER

## 2023-11-06 ENCOUNTER — TELEPHONE (OUTPATIENT)
Dept: PRIMARY CARE | Facility: CLINIC | Age: 73
End: 2023-11-06
Payer: MEDICARE

## 2023-11-13 ENCOUNTER — TELEPHONE (OUTPATIENT)
Dept: PRIMARY CARE | Facility: CLINIC | Age: 73
End: 2023-11-13
Payer: MEDICARE

## 2023-12-25 DIAGNOSIS — I47.19 ATRIAL TACHYCARDIA (CMS-HCC): ICD-10-CM

## 2023-12-28 RX ORDER — CLOPIDOGREL BISULFATE 75 MG/1
75 TABLET ORAL DAILY
Qty: 90 TABLET | Refills: 1 | Status: SHIPPED | OUTPATIENT
Start: 2023-12-28

## 2024-02-19 ENCOUNTER — APPOINTMENT (OUTPATIENT)
Dept: PULMONOLOGY | Facility: CLINIC | Age: 74
End: 2024-02-19
Payer: MEDICARE

## 2024-03-01 ENCOUNTER — APPOINTMENT (OUTPATIENT)
Dept: GERIATRIC MEDICINE | Facility: CLINIC | Age: 74
End: 2024-03-01
Payer: MEDICARE

## 2024-03-01 ENCOUNTER — OFFICE VISIT (OUTPATIENT)
Dept: NEUROLOGY | Facility: CLINIC | Age: 74
End: 2024-03-01
Payer: MEDICARE

## 2024-03-01 VITALS
RESPIRATION RATE: 18 BRPM | DIASTOLIC BLOOD PRESSURE: 81 MMHG | WEIGHT: 216.7 LBS | SYSTOLIC BLOOD PRESSURE: 144 MMHG | BODY MASS INDEX: 29.39 KG/M2 | HEART RATE: 58 BPM | TEMPERATURE: 97.6 F

## 2024-03-01 DIAGNOSIS — J42 CHRONIC BRONCHITIS, UNSPECIFIED CHRONIC BRONCHITIS TYPE (MULTI): ICD-10-CM

## 2024-03-01 DIAGNOSIS — R41.3 MEMORY CHANGE: Primary | ICD-10-CM

## 2024-03-01 PROCEDURE — 1036F TOBACCO NON-USER: CPT | Performed by: PSYCHIATRY & NEUROLOGY

## 2024-03-01 PROCEDURE — 1159F MED LIST DOCD IN RCRD: CPT | Performed by: PSYCHIATRY & NEUROLOGY

## 2024-03-01 PROCEDURE — 99215 OFFICE O/P EST HI 40 MIN: CPT | Performed by: PSYCHIATRY & NEUROLOGY

## 2024-03-01 PROCEDURE — 99205 OFFICE O/P NEW HI 60 MIN: CPT | Performed by: PSYCHIATRY & NEUROLOGY

## 2024-03-01 PROCEDURE — 1170F FXNL STATUS ASSESSED: CPT | Performed by: PSYCHIATRY & NEUROLOGY

## 2024-03-01 PROCEDURE — 3077F SYST BP >= 140 MM HG: CPT | Performed by: PSYCHIATRY & NEUROLOGY

## 2024-03-01 PROCEDURE — 1126F AMNT PAIN NOTED NONE PRSNT: CPT | Performed by: PSYCHIATRY & NEUROLOGY

## 2024-03-01 PROCEDURE — 3079F DIAST BP 80-89 MM HG: CPT | Performed by: PSYCHIATRY & NEUROLOGY

## 2024-03-01 RX ORDER — ALBUTEROL SULFATE 90 UG/1
2 AEROSOL, METERED RESPIRATORY (INHALATION) EVERY 6 HOURS PRN
Qty: 18 G | Refills: 0 | Status: SHIPPED | OUTPATIENT
Start: 2024-03-01 | End: 2024-04-23

## 2024-03-01 ASSESSMENT — ACTIVITIES OF DAILY LIVING (ADL)
GROCERY_SHOPPING: INDEPENDENT
TOILETING: INDEPENDENT
WALKS IN HOME: INDEPENDENT
FEEDING YOURSELF: INDEPENDENT
USING_TELEPHONE: INDEPENDENT
HEARING - LEFT EAR: FUNCTIONAL
STILL_DRIVING: YES
MANAGING_FINANCES: NEEDS ASSISTANCE
GROOMING: INDEPENDENT
DOING_HOUSEWORK: NEEDS ASSISTANCE
USING_TRANSPORTATION: INDEPENDENT
JUDGMENT_ADEQUATE_SAFELY_COMPLETE_DAILY_ACTIVITIES: YES
TAKING_MEDICATION: INDEPENDENT
PREPARING_MEALS: NEEDS ASSISTANCE
DRESSING YOURSELF: INDEPENDENT
BATHING: INDEPENDENT
PILL_BOX_USED: NO
PATIENT'S MEMORY ADEQUATE TO SAFELY COMPLETE DAILY ACTIVITIES?: YES
NEEDS_ASSISTANCE_WITH_FOOD: INDEPENDENT
EATING: INDEPENDENT
HEARING - RIGHT EAR: FUNCTIONAL
ADEQUATE_TO_COMPLETE_ADL: YES

## 2024-03-01 ASSESSMENT — MONTREAL COGNITIVE ASSESSMENT (MOCA)
9. REPEAT EACH SENTENCE: 2
5. MEMORY TRIALS: 0
12. MEMORY INDEX SCORE: 2
11. FOR EACH PAIR OF WORDS, WHAT CATEGORY DO THEY BELONG TO (OUT OF 2): 2
8. SERIAL SUBTRACTION OF 7S: 3
4. NAME EACH OF THE THREE ANIMALS SHOWN: 2
VISUOSPATIAL/EXECUTIVE SUBSCORE: 2
13. ORIENTATION SUBSCORE: 6
WHAT LEVEL OF EDUCATION WAS ATTAINED: 0
10. [FLUENCY] NAME WORDS STARTING WITH DESIGNATED LETTER: 0
6. READ LIST OF DIGITS [FORWARD/BACKWARD]: 2
WHAT IS THE TOTAL SCORE (OUT OF 30): 22
7. [VIGILENCE] TAP WHEN HEARING DESIGNATED LETTER: 1

## 2024-03-01 ASSESSMENT — GERIATRIC DEPRESSION SCALE SHORT VERSION (GDS-SV)
DO YOU FEEL YOU HAVE MORE PROBLEMS WITH MEMORY THAN MOST: YES
DO YOU FEEL THAT YOUR SITUATION IS HOPELESS: NO
HAVE YOU DROPPED MANY OF YOUR ACTIVITIES AND INTERESTS?: YES
DO YOU THINK IT IS WONDERFUL TO BE ALIVE NOW: YES
ARE YOU IN GOOD SPIRITS MOST OF THE TIME: YES
ARE YOU AFRAID THAT SOMETHING BAD IS GOING TO HAPPEN TO YOU: NO
DO YOU OFTEN FEEL HELPLESS: NO
ARE YOU BASICALLY SATISFIED WITH YOUR LIFE: YES
DO YOU OFTEN GET BORED: NO
DO YOU PREFER TO STAY AT HOME, RATHER THAN GOING OUT AND DOING NEW THINGS: YES
DO YOU FEEL THAT YOUR LIFE IS EMPTY: NO
DO YOU THINK THAT MOST PEOPLE ARE BETTER OFF THAN YOU ARE: NO
DO YOU FEEL FULL OF ENERGY: NO
DO YOU FEEL HAPPY MOST OF THE TIME: YES
DO YOU FEEL PRETTY WORTHLESS THE WAY YOU ARE NOW: NO
GDS TOTAL SCORE: 4

## 2024-03-01 ASSESSMENT — ENCOUNTER SYMPTOMS
LOSS OF SENSATION IN FEET: 1
DEPRESSION: 0
OCCASIONAL FEELINGS OF UNSTEADINESS: 1

## 2024-03-01 ASSESSMENT — PAIN SCALES - GENERAL: PAINLEVEL: 0-NO PAIN

## 2024-03-01 NOTE — PROGRESS NOTES
Subjective   Patient ID: Brendan Rob is a 73 y.o. male who presents for neuro eval.    Identifying Information                              : 1950           Assessment date: 3/1/2024    Objective     Patient accompanied by:  wife, Wells         History provided by: patient and wife    CONCERNS IDENTIFIED BY NURSING AND SOCIAL WORK (Safety Risks, Health Issues, POA not in Chart, etc)     1. STML     2.  Possible safety concerns around driving     3.  Some reduced mobility, but doing alright    Social History                           Social History     Socioeconomic History    Marital status:      Spouse name: Komal    Number of children: 3    Years of education: None    Highest education level: Associate degree: occupational, technical, or vocational program   Occupational History    Occupation:    Tobacco Use    Smoking status: Former     Packs/day: 1.00     Years: 25.00     Additional pack years: 0.00     Total pack years: 25.00     Types: Cigarettes     Start date:      Quit date:      Years since quittin.1     Passive exposure: Past    Smokeless tobacco: Never   Substance and Sexual Activity    Alcohol use: Not Currently     Comment: stopped in     Drug use: Not Currently    Sexual activity: None   Other Topics Concern    None   Social History Narrative    None     Social Determinants of Health     Financial Resource Strain: Not on file   Food Insecurity: Not on file   Transportation Needs: Not on file   Physical Activity: Not on file   Stress: Not on file   Social Connections: Not on file   Intimate Partner Violence: Not on file   Housing Stability: Not on file        ENCOUNTER SCREENING RESULTS          NURSING ASSESSMENT    ADL Screening  Patient's Vision Adequate to Safely Complete Daily Activities: Yes  Patient's Judgment Adequate to Safely Complete Daily Activities: Yes  Patient's Memory Adequate to Safely Complete Daily Activities: Yes  Patient Able to  Express Needs/Desires: Yes  Which is your dominant hand?: Right  Dressing: Independent  Grooming: Independent  Feeding: Independent  Bathing: Independent  Toileting: Independent  In/Out Bed: Independent  Walks in Home: Independent  Weakness of Legs: Right (right leg drop foot)  Weakness of Arms/Hands: None  Hearing - Right Ear: Functional  Hearing - Left Ear: Functional     IADL's  Using Telephone: Independent  Grocery Shopping: Independent  Preparing Meals: Needs assistance (wife)  Using Transportation: Independent  Still Driving: Yes (does not drive the freeway, wife does not drive)           Nutrition and Exercise  Current Diet: Well Balanced Diet  Appetite:: Good  Food Consistency:: Regular  Liquids Consistency:: Thin  Changes in Weight?: No     SOCIAL WORK ASSESSMENT    Advance Directives/Legal/Financial     DPOA for healthcare: NA      DPOA for finances: NA       Legal guardian:  NA     Living Will: no     On any form of disability? no If so, explain:     ? no  If so, explain:     Significant financial stressors: none noted    Family History      Parent or sibling with neurodegenerative diagnosis: NA     Extended family members with relevant health history: NA    Living Situation      Type of residence: Living in [facility] with wife. Feels safe.     People living in the home: patient and wife    Supportive Relationships (Informal Support)     Spouse/partner information:  to wife, Komal, for 44 yrs. Both previously .     Children Information:  Patient had 3 sons from first marriage:  Mauro, Jefferson and Brendan.  All live locally, but only Mauro keeps up with him.     Other social supports:  none noted.    Formal Supports     Engaged community services (Emergency Alert, HHA, MOW, Case Management, Etc.): NA    Mental Health     Sleep: Sleeps well. Goes to bed between 11 and 12 and gets up at 8 or 9.     Energy: Fair. Does feel he has less energy than he used to.     Mood:  within normal limits     Affect: calm and pleasant     Notable Loss/Grief: NA     Self-harm/suicidal thoughts, plan: None Reported     Interests/Hobbies/Activities/Daily Routine: Mainly watches TV. He and wife do go shopping and enjoy seeing grandkids. Wife, Komal, also has children from her first marriage and sounds as if they see them regularly.     History of outpatient psychiatric services: NA     History of inpatient psychiatric services (hospitalization): NA     Medications for mental health past or present: NA     History of addiction services: NA    Assessment/Plan   Impression:  Patient seemingly here for neuro eval of STML, cognitive changes, but neither he nor his wife really mentioned it until later, when asked about memory and daily functioning. They seem to be managing alright. Patient denied any significant functional deficits, knew 911, etc. Wife has some issues of her own and seems caring and concerned, but not particularly forthcoming with impressions or information.    Plan: Encourage family member to attend follow up appts. Review of meds and overall testing and offer feedback/impressions. Provide information on community resources, supports, prn. Mr. Rob seems able to do his routine and not exhibiting any concerning deficits in relation to that.

## 2024-03-01 NOTE — PROGRESS NOTES
[place], [place]      Department of Neurology  [facility]     Initial Consultation    PCP: Dr. Anna Arana      2024  Re: Darron Brendan ALEJANDRO  : 1950  MRN [id number]    CC: 74yo woman referred for the evaluation of mild cognitive impairment.  Info from pt, son (You), and juan (POA), and the EMR.    A&P: History: Pt w/ 4y h/o memory difficulty prompting his stopping alcohol, marijuana, and smoking.  Genl exam w/ and irregularly irregular rhythm w/o pt symptoms or awareness.  Neuro exam with mild symm paratonia and subtle bilat finger fine tremor.  Cognitive exam with poor imbedded figure recognition and poor color-name attention control in Stroop paradigm. Testing: MoCA = c/w mild impairment.  Labs are notable for a GFR=42, neoa=943, ofa=218, and chol/hdl=5.7.  Head CT () shows mild gyral atrophy and proportionate Vmegaly.     Interpret: Pt with several years of progressive memory impairment, initially in the context of mixed substance use, not stopped.  The presentation is c/w mild cognitive impairment.    Plan: I will schedule labs, CMP to f/u on GFR=42 last year's and lipid profile with a TSH to assess potential thyroid contributions to cognition and cardiac rhythm, EKG, and brain MRI to better define neurodegenerative and cerebrovascular contributions to his impairment and neuropsychological profile.    F/U:  I discussed these matters with the pt and companions.  They asked questions and showed good understanding.  I will arrange RTC in 3 months & encouraged contact for issues arising  .   Thank you for allowing me to participate in your care.   Sincerely yours, Devan Smith M.D., Ph.D.    History of Present Illness:   ~:  Pt noticed trouble remembering things, although “He kept doing his things, smoking cigarettes and marijuana,” per wife.  : There has been gradual worsening memory.  Conversation to get more specifics leads to  his telling me it is a mix of things.  His wife tells me he is doing less around the house.      Med Hx   Allergies: NKAD  Rx:   [id number],    lisinopril-hctz20-12.5, [id number],   [id number],    glimiperide1,  [id number],    albuterol inh, tiotropium inh  H/O: SVT/Atach/AFib, HChol, PVD,   covidàchr exert dyspnea/COPD,  DM2,  Rt foot drop,  weakness, PALACIOS, memory loss, depression, ED   S/P: CABG, “muscles in Rt leg” after foot drop[  Implants: none  Trauma: none    FHx  Par: Dad pneumonia; Mom old age      Sibs:  3 bro 3 sis are well  Kids:  5 boys from both marriages (2 estanged)    PSHx  Marital: 2nd marriage  Home: [facility]     Educ: 12 grd    Employ: retired    Driving: continues  Sleep: no issues     Exercise: treadmill  Firearms: none  EtOH:   stopped    Smoking: stopped      Subs Abuse: marijuana stopped         ROS  Genl: w/o Skin-Skel nl Cardio-pulm nl   U/L-GI nl    Neuro:  w/o LBP nl  w/o neck pain wnl w/o HAs wnl   w/o CVA, TIA, TMB,  VBI     Physical Exam  Vitals: ST=124/81, HR=58, RR=18, ov=375  General:  Neck FROM w/o pain  Eye gnds w/ nl discs & vsls (? early cats)   Lungs w/ clear,     IIR w/ pauses and ectopies MRG,  full carotids w/o T/B        Abdo soft +BS no bruit       Extrems w/o depend edema    Neurological Exam  Mental State: Affect:  not anxious or sad   preserved range    no hallucins, delusions, or agitation  Cranial Nerves: Vision: PERRLsubtle & symm D&C,  VFF OU to finger approach     Versions: FEOM horiz & vert  w/o nystagmus or diplopia  lids w/o ptosis   Motility:  intact saccs, pursuit, and hOKNs   Face: Sens symm to LT & cold,     Expression: symm tone & mvmt   Hearing to voice wnl,   Tongue: mobile w/o fascics    Shoulders: symm mobile  Motor: strong proportionate to bulk      w/o UE drift            w/ steady  bilat   tone: mild symm paratonia    subtle rest tremor Lt>Rt fingers  w/o synkinesia    Reflexes: MSRs +2 w/o clonus  or spread   Release:  no  Mcginnis's/grasp/PMR  Sensation:  symm LT, cold, and vib face > hands > ankles   -Romberg,  symm sway <1cm     Coordination: FT fast w/ reg pace bilat     FNF accurate & symm to fixed target     Gait: nl pace, stride, armswing  wnl turning in 3.0 steps,   stable tandem forward     Cognitive Exam   Handedness:  fully RH     Language:  primary: American English    recept:  answers & follows instrucs wnl       express: phrasesx3-5words    rare full sents     word-finding w/o pauses   w/o paraphasias   repeat:  complete w nl pace    name:  body parts=3/3   objects=5/5       read:  pace & rhythm wnl          w/o paralexias    Praxis:  intrans: hand shapes to model=3/3 (5th fing demod)   transitive: pen twirling  Lt wnl Rt wnl Luria Seq Learn:  Rt on Lt:  wnl /p 2 demos        Lt on Rt w/ wnl  /p 1 demos     Percept:  visual: traffic signs= 4/4,  imbedded objs (w/o clutter)=2/5     tactile: palm graphesthesia (XLTOC)   Lt wnl      Rt wnl    Attention: visual:   Lt/Rt w/o DSSE   tactile: Lt/Rt hand contact w/o DSSE wnl     Memory: visspat: room topology (door, pc, prev room) =3/3    remote: USholidays=3/3     Executive:  Color-name Stroop: names=0/9 errors   colors= 7/9 errors   Calcs: simple subtract nl  carry subtract nl  simple multiplic nl       Testing MoCA=22/30, c/w mild impairment    Labs (to 10-):  wbc=9.0,    hct=42.5,    shs=368   glu=93, bun/crt=22/1.71,  GFR=42,     AST=23  ALT=15  AlkP=73   rkvw=965, hdl=46.6, qhn=347, chol/hdl=5.7, vldl=22, GN=791    TSH=     fT4=   B1=     B12=,  Fol=,  D3=  EKG (0-XYZ-20): Sinus rate= QTc= Abnls:   Head CT (8-):  GW diff intact; no mass or shift. Mild patchy hypoatten in perivent and subcort WM.  Mild atrophy.   Atherosclerotic calcifications suboptimally evaluated.  Probable lipoma Rt parietal scalp.  Mild ethmoid and maxillary sinus mucosal thickening. Partial opacification mastoid air cells of uncertain chronicity.    No definite acute intracranial  findings.  Brain MRI (0-XYZ-20):

## 2024-03-18 ENCOUNTER — OFFICE VISIT (OUTPATIENT)
Dept: PULMONOLOGY | Facility: CLINIC | Age: 74
End: 2024-03-18
Payer: MEDICARE

## 2024-03-18 VITALS
OXYGEN SATURATION: 97 % | HEIGHT: 72 IN | TEMPERATURE: 97.4 F | DIASTOLIC BLOOD PRESSURE: 76 MMHG | WEIGHT: 210 LBS | SYSTOLIC BLOOD PRESSURE: 135 MMHG | BODY MASS INDEX: 28.44 KG/M2 | RESPIRATION RATE: 20 BRPM | HEART RATE: 60 BPM

## 2024-03-18 DIAGNOSIS — R06.00 DYSPNEA, UNSPECIFIED TYPE: ICD-10-CM

## 2024-03-18 DIAGNOSIS — Z87.891 HISTORY OF NICOTINE DEPENDENCE: Primary | ICD-10-CM

## 2024-03-18 PROCEDURE — 3078F DIAST BP <80 MM HG: CPT | Performed by: STUDENT IN AN ORGANIZED HEALTH CARE EDUCATION/TRAINING PROGRAM

## 2024-03-18 PROCEDURE — 1036F TOBACCO NON-USER: CPT | Performed by: STUDENT IN AN ORGANIZED HEALTH CARE EDUCATION/TRAINING PROGRAM

## 2024-03-18 PROCEDURE — 99213 OFFICE O/P EST LOW 20 MIN: CPT | Performed by: STUDENT IN AN ORGANIZED HEALTH CARE EDUCATION/TRAINING PROGRAM

## 2024-03-18 PROCEDURE — 1159F MED LIST DOCD IN RCRD: CPT | Performed by: STUDENT IN AN ORGANIZED HEALTH CARE EDUCATION/TRAINING PROGRAM

## 2024-03-18 PROCEDURE — 1126F AMNT PAIN NOTED NONE PRSNT: CPT | Performed by: STUDENT IN AN ORGANIZED HEALTH CARE EDUCATION/TRAINING PROGRAM

## 2024-03-18 PROCEDURE — 3075F SYST BP GE 130 - 139MM HG: CPT | Performed by: STUDENT IN AN ORGANIZED HEALTH CARE EDUCATION/TRAINING PROGRAM

## 2024-03-18 ASSESSMENT — PAIN SCALES - GENERAL: PAINLEVEL: 0-NO PAIN

## 2024-03-18 NOTE — PROGRESS NOTES
Subjective   Patient ID: Brendan Rob is a 73 y.o. male who presents for COPD (FUV).  HPI    3/18/2024    Follows with Dr. Calixto--repeat echo pending   CT chest is also pending   No acute complaints today   Symptoms stable   No new lung infections   CAT is 8 today     Pulmonary medications: albuterol 2-3 times a day     7/2023   72 year old male here to establish care with pulmonary   Previously been seen by Dr House   Respiratory failure in Jan 2022 secondary to covid infection --was hospitalized   Denies any history of asthma  Denies any hospitalization for respiratory issues in the last year.   occasional snore, no PND   Baseline dyspnea with activity   Is prescribed spiriva but not really using it      pulm meds: albuterol 1-2 times , not using spiriva  PMH/PSH: HTN, HLD, pAF, DM, CAD   SH: smoked for 20 years--1ppd, quit in Dec 2021, denies any other tobacco use. Occupation hx: . No pets at home   Review of Systems   Constitutional:  Negative for chills, fever and unexpected weight change.   HENT:  Negative for trouble swallowing.    Respiratory:  Negative for shortness of breath.    Cardiovascular:  Negative for chest pain.   Gastrointestinal:  Negative for abdominal distention, abdominal pain, anal bleeding, blood in stool, constipation, diarrhea, nausea, rectal pain and vomiting.   Skin:  Negative for color change.       Objective   Physical Exam  Constitutional:       Appearance: Normal appearance.   HENT:      Head: Normocephalic and atraumatic.   Eyes:      Pupils: Pupils are equal, round, and reactive to light.   Cardiovascular:      Rate and Rhythm: Normal rate and regular rhythm.   Pulmonary:      Effort: Pulmonary effort is normal.      Breath sounds: Normal breath sounds.   Skin:     Findings: No rash.   Neurological:      General: No focal deficit present.      Mental Status: He is alert and oriented to person, place, and time.   Psychiatric:         Mood and Affect: Mood normal.        Vitals:    24 0936   BP: 135/76   Pulse: 60   Resp: 20   Temp: 36.3 °C (97.4 °F)   SpO2: 97%           Testing   PFT: 2023     FEV1/FVC:  68 (LLN 63)  post FEV1: 85% T% DLCO: 43%     6mw: 52 m moe sat 95%--was having leg pain       Assessment/Plan       Brendan Rob is a 73 y.o. year old male patient prior smoker with history of covid infection, CAD, pAF, HTN, HLD here to establish care with pulmonary. Had been told he may have asthma but no definitive diagnosis based on pulmonary testing. Pulmonary testing in  was done in proximity to his covid infection and interpretation of those results suggests that there is some air trapping and either interstitial process or pulm vascular issue (based on reduced DLCO). Since his GGO improved, recommend that we repeat his pulmonary testing to get an accurate picture of his lung function. There is also some mild left pleural thickening that will need to be followed      Dyspnea--multifactorial--no evidence of obstruction on pfts  -continue as needed albuterol   -encouraged to get echo scheduled   -continue to follow with cardiology      Lung cancer screening   -patient qualifies for lung cancer screening, last CT chest done 2023, encouraged to get CT chest scheduled   -continue to stay abstinent from smoking      RTC in 6 months or sooner if needed     Shelia Jorgensen MD 24 9:39 AM

## 2024-03-18 NOTE — PATIENT INSTRUCTIONS
Thank you for visiting the Pulmonary Clinic today.   Your breathing medications: albuterol inhaler as needed   Tests: CT scan of the lungs, make sure to schedule the echo of your heart  Return in 6 months   If you have questions or concerns, call [phone] (option 4)

## 2024-03-19 ASSESSMENT — ENCOUNTER SYMPTOMS
ANAL BLEEDING: 0
FEVER: 0
TROUBLE SWALLOWING: 0
DIARRHEA: 0
VOMITING: 0
CONSTIPATION: 0
NAUSEA: 0
COLOR CHANGE: 0
UNEXPECTED WEIGHT CHANGE: 0
ABDOMINAL DISTENTION: 0
SHORTNESS OF BREATH: 0
CHILLS: 0
BLOOD IN STOOL: 0
ABDOMINAL PAIN: 0
RECTAL PAIN: 0

## 2024-04-18 ENCOUNTER — OFFICE VISIT (OUTPATIENT)
Dept: PRIMARY CARE | Facility: CLINIC | Age: 74
End: 2024-04-18
Payer: MEDICARE

## 2024-04-18 ENCOUNTER — LAB (OUTPATIENT)
Dept: LAB | Facility: LAB | Age: 74
End: 2024-04-18
Payer: MEDICARE

## 2024-04-18 VITALS — WEIGHT: 231 LBS | BODY MASS INDEX: 31.33 KG/M2 | SYSTOLIC BLOOD PRESSURE: 148 MMHG | DIASTOLIC BLOOD PRESSURE: 72 MMHG

## 2024-04-18 DIAGNOSIS — I73.9 PERIPHERAL VASCULAR DISEASE (CMS-HCC): ICD-10-CM

## 2024-04-18 DIAGNOSIS — R41.3 MEMORY CHANGE: ICD-10-CM

## 2024-04-18 DIAGNOSIS — Z12.11 COLON CANCER SCREENING: ICD-10-CM

## 2024-04-18 DIAGNOSIS — I10 BENIGN ESSENTIAL HYPERTENSION: ICD-10-CM

## 2024-04-18 DIAGNOSIS — E78.00 PURE HYPERCHOLESTEROLEMIA: ICD-10-CM

## 2024-04-18 DIAGNOSIS — Z72.0 TOBACCO USE: ICD-10-CM

## 2024-04-18 DIAGNOSIS — N18.31 STAGE 3A CHRONIC KIDNEY DISEASE (MULTI): ICD-10-CM

## 2024-04-18 DIAGNOSIS — E11.9 TYPE 2 DIABETES MELLITUS WITHOUT COMPLICATION, WITHOUT LONG-TERM CURRENT USE OF INSULIN (MULTI): ICD-10-CM

## 2024-04-18 DIAGNOSIS — E11.51 TYPE 2 DIABETES MELLITUS WITH DIABETIC PERIPHERAL ANGIOPATHY WITHOUT GANGRENE, WITHOUT LONG-TERM CURRENT USE OF INSULIN (MULTI): ICD-10-CM

## 2024-04-18 DIAGNOSIS — Z00.00 GENERAL MEDICAL EXAM: Primary | ICD-10-CM

## 2024-04-18 DIAGNOSIS — J96.10 CHRONIC RESPIRATORY FAILURE, UNSPECIFIED WHETHER WITH HYPOXIA OR HYPERCAPNIA (MULTI): ICD-10-CM

## 2024-04-18 DIAGNOSIS — J42 CHRONIC BRONCHITIS, UNSPECIFIED CHRONIC BRONCHITIS TYPE (MULTI): ICD-10-CM

## 2024-04-18 DIAGNOSIS — F39 MOOD DISORDER (CMS-HCC): ICD-10-CM

## 2024-04-18 DIAGNOSIS — I48.0 PAROXYSMAL ATRIAL FIBRILLATION (MULTI): ICD-10-CM

## 2024-04-18 DIAGNOSIS — F19.21 HISTORY OF SUBSTANCE DEPENDENCE (MULTI): ICD-10-CM

## 2024-04-18 LAB
ERYTHROCYTE [DISTWIDTH] IN BLOOD BY AUTOMATED COUNT: 14.5 % (ref 11.5–14.5)
EST. AVERAGE GLUCOSE BLD GHB EST-MCNC: 131 MG/DL
HBA1C MFR BLD: 6.2 %
HCT VFR BLD AUTO: 38.2 % (ref 41–52)
HGB BLD-MCNC: 12.7 G/DL (ref 13.5–17.5)
MCH RBC QN AUTO: 29.1 PG (ref 26–34)
MCHC RBC AUTO-ENTMCNC: 33.2 G/DL (ref 32–36)
MCV RBC AUTO: 88 FL (ref 80–100)
NRBC BLD-RTO: 0 /100 WBCS (ref 0–0)
PLATELET # BLD AUTO: 387 X10*3/UL (ref 150–450)
RBC # BLD AUTO: 4.36 X10*6/UL (ref 4.5–5.9)
WBC # BLD AUTO: 8.5 X10*3/UL (ref 4.4–11.3)

## 2024-04-18 PROCEDURE — 80061 LIPID PANEL: CPT

## 2024-04-18 PROCEDURE — 3078F DIAST BP <80 MM HG: CPT | Performed by: INTERNAL MEDICINE

## 2024-04-18 PROCEDURE — 85027 COMPLETE CBC AUTOMATED: CPT

## 2024-04-18 PROCEDURE — 84443 ASSAY THYROID STIM HORMONE: CPT

## 2024-04-18 PROCEDURE — 80053 COMPREHEN METABOLIC PANEL: CPT

## 2024-04-18 PROCEDURE — 99214 OFFICE O/P EST MOD 30 MIN: CPT | Performed by: INTERNAL MEDICINE

## 2024-04-18 PROCEDURE — 36415 COLL VENOUS BLD VENIPUNCTURE: CPT

## 2024-04-18 PROCEDURE — 1160F RVW MEDS BY RX/DR IN RCRD: CPT | Performed by: INTERNAL MEDICINE

## 2024-04-18 PROCEDURE — 1159F MED LIST DOCD IN RCRD: CPT | Performed by: INTERNAL MEDICINE

## 2024-04-18 PROCEDURE — G0439 PPPS, SUBSEQ VISIT: HCPCS | Performed by: INTERNAL MEDICINE

## 2024-04-18 PROCEDURE — 1036F TOBACCO NON-USER: CPT | Performed by: INTERNAL MEDICINE

## 2024-04-18 PROCEDURE — 1170F FXNL STATUS ASSESSED: CPT | Performed by: INTERNAL MEDICINE

## 2024-04-18 PROCEDURE — 3077F SYST BP >= 140 MM HG: CPT | Performed by: INTERNAL MEDICINE

## 2024-04-18 PROCEDURE — 83036 HEMOGLOBIN GLYCOSYLATED A1C: CPT

## 2024-04-18 ASSESSMENT — ACTIVITIES OF DAILY LIVING (ADL)
BATHING: INDEPENDENT
MANAGING_FINANCES: INDEPENDENT
DRESSING: INDEPENDENT
DOING_HOUSEWORK: INDEPENDENT
GROCERY_SHOPPING: INDEPENDENT
TAKING_MEDICATION: INDEPENDENT

## 2024-04-18 ASSESSMENT — ENCOUNTER SYMPTOMS
EYES NEGATIVE: 1
ENDOCRINE NEGATIVE: 1
CARDIOVASCULAR NEGATIVE: 1
CONSTITUTIONAL NEGATIVE: 1
MUSCULOSKELETAL NEGATIVE: 1
RESPIRATORY NEGATIVE: 1
OCCASIONAL FEELINGS OF UNSTEADINESS: 0
DEPRESSION: 0
LOSS OF SENSATION IN FEET: 0
HEMATOLOGIC/LYMPHATIC NEGATIVE: 1
NEUROLOGICAL NEGATIVE: 1
PSYCHIATRIC NEGATIVE: 1
GASTROINTESTINAL NEGATIVE: 1

## 2024-04-18 ASSESSMENT — PATIENT HEALTH QUESTIONNAIRE - PHQ9
SUM OF ALL RESPONSES TO PHQ9 QUESTIONS 1 AND 2: 0
1. LITTLE INTEREST OR PLEASURE IN DOING THINGS: NOT AT ALL
2. FEELING DOWN, DEPRESSED OR HOPELESS: NOT AT ALL

## 2024-04-18 NOTE — PROGRESS NOTES
Subjective   Patient ID: Brendan Rob is a 73 y.o. male.    HPI  A 73 y.o male with past medical history of hypertension, CAD, HLD, Type2 DM  comes for follow up. Patient had no complain today. He denies fever, chest pain, abdominal pain, nausea, vomiting, leg pain or leg swelling  Review of Systems   Constitutional: Negative.    HENT: Negative.     Eyes: Negative.    Respiratory: Negative.     Cardiovascular: Negative.    Gastrointestinal: Negative.    Endocrine: Negative.    Genitourinary: Negative.    Musculoskeletal: Negative.    Neurological: Negative.    Hematological: Negative.    Psychiatric/Behavioral: Negative.         Objective   Physical Exam  Constitutional:       Appearance: Normal appearance.   Cardiovascular:      Rate and Rhythm: Normal rate and regular rhythm.      Pulses: Normal pulses.      Heart sounds: Normal heart sounds.   Pulmonary:      Breath sounds: Normal breath sounds.   Abdominal:      General: Abdomen is flat. Bowel sounds are normal.      Palpations: Abdomen is soft.   Musculoskeletal:         General: Normal range of motion.   Skin:     General: Skin is warm.   Neurological:      General: No focal deficit present.      Mental Status: He is alert.         Assessment/Plan   Diagnoses and all orders for this visit:  General medical exam  Benign essential hypertension  -     CBC; Future  Pure hypercholesterolemia  -     CBC; Future  -     Lipid Panel; Future  Type 2 diabetes mellitus without complication, without long-term current use of insulin (Multi)  -     CBC; Future  -     Hemoglobin A1C; Future  Tobacco use  History of substance dependence (Multi)  Type 2 diabetes mellitus with diabetic peripheral angiopathy without gangrene, without long-term current use of insulin (Multi)  Stage 3a chronic kidney disease (Multi)  Chronic respiratory failure, unspecified whether with hypoxia or hypercapnia (Multi)  Chronic bronchitis, unspecified chronic bronchitis type (Multi)  Mood  disorder (CMS-HCC)  Paroxysmal atrial fibrillation (Multi)  Peripheral vascular disease (CMS-HCC)  Colon cancer screening  Other orders  -     Follow Up In Primary Care - Established; Future  A 73 y.o male with past medical history of hypertension, CAD, HLD, Type2 DM  comes for follow up.    # Type 2 DM  - Well Controlled  - A1C ordered  - Continue Glimepiride 1mg daily    # HLD  Continue atorvastatin 40 mg daily  -Lipid Panel pending    # Hypertension  - Continue Lisinopril 20 mg dally  - Continue Hydrochlorothiazide 12.5   - Continue Metoprolol Succinate 25 daily     # CAD  - Continue Clopidogrel 75 mg daily  - Continue Atorvastatin 40 daily     Medicare Wellness Billing Compliance Satisfied    *This is a visual tool to show completion of required items on the day of the visit. Green checks will only appear on the date of visit.    Review all medications by prescribing practitioner or clinical pharmacist (such as prescriptions, OTCs, herbal therapies and supplements) documented in the medical record    Past Medical, Surgical, and Family History reviewed and updated in chart    Tobacco Use Reviewed    Alcohol Use Reviewed    Illicit Drug Use Reviewed    PHQ2/9    Falls in Last Year Reviewed    Home Safety Risk Factors Reviewed    Cognitive Impairment Reviewed    Patient Self Assessment and Health Status    Current Diet Reviewed    Exercise Frequency    ADL - Hearing Impairment    ADL - Bathing    ADL - Dressing    ADL - Walks in Home    IADL - Managing Finances    IADL - Grocery Shopping    IADL - Taking Medications    IADL - Doing Housework

## 2024-04-18 NOTE — PROGRESS NOTES
Subjective   Reason for Visit: Brendan Rob is an 73 y.o. male here for a Medicare Wellness visit.          Reviewed all medications by prescribing practitioner or clinical pharmacist (such as prescriptions, OTCs, herbal therapies and supplements) and documented in the medical record.    HPI  A 73 y.o male with past medical history of hypertension, CAD, HLD, Type2 DM  comes for follow up. Patient had no complain today. He denies fever, chest pain, abdominal pain, nausea, vomiting, leg pain or leg swelling  Patient Care Team:  Anna Arana DO as PCP - General  Honey Gomez MD as PCP - [employer] PCP     Review of Systems   Constitutional: Negative.    Eyes: Negative.    Respiratory: Negative.     Cardiovascular: Negative.    Endocrine: Negative.    Genitourinary: Negative.    Musculoskeletal: Negative.    Neurological: Negative.    Hematological: Negative.    Psychiatric/Behavioral: Negative.         Objective   Vitals:  /72   Wt 105 kg (231 lb)   BMI 31.33 kg/m²       Physical Exam  HENT:      Mouth/Throat:      Mouth: Mucous membranes are moist.   Cardiovascular:      Rate and Rhythm: Normal rate and regular rhythm.   Pulmonary:      Breath sounds: Normal breath sounds.   Abdominal:      General: Abdomen is flat. Bowel sounds are normal.   Musculoskeletal:         General: Normal range of motion.   Skin:     General: Skin is warm.      Capillary Refill: Capillary refill takes less than 2 seconds.   Neurological:      Mental Status: He is alert.         Assessment/Plan   Problem List Items Addressed This Visit       Benign essential hypertension - Primary    Relevant Orders    CBC    Diabetes mellitus (Multi)    Relevant Orders    CBC    Hemoglobin A1C    Pure hypercholesterolemia    Relevant Orders    CBC    Lipid Panel        A 73 y.o male with past medical history of hypertension, CAD, HLD, Type2 DM  comes for follow up. Patient had no complain today. He denies fever, chest  pain, abdominal pain, nausea, vomiting, leg pain or leg swelling

## 2024-04-18 NOTE — ADDENDUM NOTE
Addended by: GUMARO SAWYER on: 4/18/2024 01:44 PM     Modules accepted: Orders, Level of Service

## 2024-04-19 DIAGNOSIS — J42 CHRONIC BRONCHITIS, UNSPECIFIED CHRONIC BRONCHITIS TYPE (MULTI): ICD-10-CM

## 2024-04-19 LAB
ALBUMIN SERPL BCP-MCNC: 3.5 G/DL (ref 3.4–5)
ALP SERPL-CCNC: 57 U/L (ref 33–136)
ALT SERPL W P-5'-P-CCNC: 9 U/L (ref 10–52)
ANION GAP SERPL CALC-SCNC: 14 MMOL/L (ref 10–20)
AST SERPL W P-5'-P-CCNC: 19 U/L (ref 9–39)
BILIRUB SERPL-MCNC: 0.4 MG/DL (ref 0–1.2)
BUN SERPL-MCNC: 18 MG/DL (ref 6–23)
CALCIUM SERPL-MCNC: 8.6 MG/DL (ref 8.6–10.6)
CHLORIDE SERPL-SCNC: 109 MMOL/L (ref 98–107)
CHOLEST SERPL-MCNC: 231 MG/DL (ref 0–199)
CHOLESTEROL/HDL RATIO: 4.9
CO2 SERPL-SCNC: 24 MMOL/L (ref 21–32)
CREAT SERPL-MCNC: 1.45 MG/DL (ref 0.5–1.3)
EGFRCR SERPLBLD CKD-EPI 2021: 51 ML/MIN/1.73M*2
GLUCOSE SERPL-MCNC: 64 MG/DL (ref 74–99)
HDLC SERPL-MCNC: 46.9 MG/DL
LDLC SERPL CALC-MCNC: 169 MG/DL
NON HDL CHOLESTEROL: 184 MG/DL (ref 0–149)
POTASSIUM SERPL-SCNC: 4.5 MMOL/L (ref 3.5–5.3)
PROT SERPL-MCNC: 6.2 G/DL (ref 6.4–8.2)
SODIUM SERPL-SCNC: 142 MMOL/L (ref 136–145)
TRIGL SERPL-MCNC: 75 MG/DL (ref 0–149)
TSH SERPL-ACNC: 1.67 MIU/L (ref 0.44–3.98)
VLDL: 15 MG/DL (ref 0–40)

## 2024-04-23 DIAGNOSIS — E78.00 PURE HYPERCHOLESTEROLEMIA: ICD-10-CM

## 2024-04-23 RX ORDER — ALBUTEROL SULFATE 90 UG/1
2 AEROSOL, METERED RESPIRATORY (INHALATION) EVERY 6 HOURS PRN
Qty: 17 G | Refills: 1 | Status: SHIPPED | OUTPATIENT
Start: 2024-04-23

## 2024-04-23 RX ORDER — ATORVASTATIN CALCIUM 80 MG/1
80 TABLET, FILM COATED ORAL NIGHTLY
Qty: 90 TABLET | Refills: 1 | Status: SHIPPED | OUTPATIENT
Start: 2024-04-23

## 2024-05-01 LAB — NONINV COLON CA DNA+OCC BLD SCRN STL QL: NEGATIVE

## 2024-06-12 DIAGNOSIS — I47.19 ATRIAL TACHYCARDIA (CMS-HCC): ICD-10-CM

## 2024-06-13 RX ORDER — CLOPIDOGREL BISULFATE 75 MG/1
75 TABLET ORAL DAILY
Qty: 90 TABLET | Refills: 0 | Status: SHIPPED | OUTPATIENT
Start: 2024-06-13

## 2024-06-21 ENCOUNTER — OFFICE VISIT (OUTPATIENT)
Dept: NEUROLOGY | Facility: CLINIC | Age: 74
End: 2024-06-21
Payer: MEDICARE

## 2024-06-21 VITALS
BODY MASS INDEX: 28.68 KG/M2 | TEMPERATURE: 97.9 F | HEART RATE: 60 BPM | WEIGHT: 211.5 LBS | RESPIRATION RATE: 18 BRPM | SYSTOLIC BLOOD PRESSURE: 134 MMHG | DIASTOLIC BLOOD PRESSURE: 66 MMHG

## 2024-06-21 DIAGNOSIS — R41.3 MEMORY CHANGE: Primary | ICD-10-CM

## 2024-06-21 PROCEDURE — 3050F LDL-C >= 130 MG/DL: CPT | Performed by: PSYCHIATRY & NEUROLOGY

## 2024-06-21 PROCEDURE — 1036F TOBACCO NON-USER: CPT | Performed by: PSYCHIATRY & NEUROLOGY

## 2024-06-21 PROCEDURE — 99215 OFFICE O/P EST HI 40 MIN: CPT | Performed by: PSYCHIATRY & NEUROLOGY

## 2024-06-21 PROCEDURE — 3075F SYST BP GE 130 - 139MM HG: CPT | Performed by: PSYCHIATRY & NEUROLOGY

## 2024-06-21 PROCEDURE — 3078F DIAST BP <80 MM HG: CPT | Performed by: PSYCHIATRY & NEUROLOGY

## 2024-06-21 PROCEDURE — 1126F AMNT PAIN NOTED NONE PRSNT: CPT | Performed by: PSYCHIATRY & NEUROLOGY

## 2024-06-21 PROCEDURE — 3044F HG A1C LEVEL LT 7.0%: CPT | Performed by: PSYCHIATRY & NEUROLOGY

## 2024-06-21 ASSESSMENT — ENCOUNTER SYMPTOMS
LOSS OF SENSATION IN FEET: 1
DEPRESSION: 0
OCCASIONAL FEELINGS OF UNSTEADINESS: 0

## 2024-06-21 ASSESSMENT — PATIENT HEALTH QUESTIONNAIRE - PHQ9
SUM OF ALL RESPONSES TO PHQ9 QUESTIONS 1 AND 2: 0
2. FEELING DOWN, DEPRESSED OR HOPELESS: NOT AT ALL
1. LITTLE INTEREST OR PLEASURE IN DOING THINGS: NOT AT ALL

## 2024-06-21 ASSESSMENT — PAIN SCALES - GENERAL: PAINLEVEL: 0-NO PAIN

## 2024-06-21 NOTE — PROGRESS NOTES
[place], [place]      Department of Neurology  [facility]     FU1 Consultation    PCP: Dr. Anna Arana      2024  Re: Jace Robfuwm ALLEN  : 1950  MRN [id number]    CC: 74yo man referred for the evaluation of mild cognitive impairment.   Info from pt, w/ wife (x47ys), and the EMR.  HPI: ~:  Pt noticed trouble remembering things, although “He kept doing his things, smoking cigarettes and marijuana,” per wife.  : There has been gradual worsening memory.  Conversation to get more specifics leads to his telling me it is a mix of things.  His wife tells me he is doing less around the house.  : Stays in the hse, eats okay, he sts sugars are in nl range, never hospitalized.  He tells me he sometimes loses things, wife agrees.  Not smoking or drinking.  Wife adds to our discussion that he falls sometimes, tripping, not using touch points.  Wife tells me of his memory issues, he says he doesn't notice it, but she does.  Med Hx  Allergies: NKAD  Rx:   stmibo41,    lisinopril-hctz20-12.5, jhqxzjlotnTS30,   wfeooxqyoblv57,    glimiperide1,  purmloj364K,    albuterol inh, tiotropium inh  H/O: SVT/Atach/AFib, HChol, PVD,   covidàchr exert dyspnea/COPD,  DM2,  Rt foot drop,  weakness, PALACIOS, memory loss, depression (wife says he's been called bipolar), ED,   S/P: CABG, “muscles in Rt leg” after foot drop  FHx: Dad d pneumonia; Mom old age;  3 bro 3 sis are well;  5 sons of 2 marriages (2 estanged)  PSHx: [facility], 12 grd, retired ; drives, sleep good, uses treadmill   ROS: w/o Skin-Skel nl, Cardio-pulm nl, U/L-GI nl  Neuro: w/o LBP, neck pain, HAs or CVAs  Exam: UB=558/81, HR=46à70, RR=18, gy=520  Genl:  Lungs w/ clear,  RRR no aud murmurs, or pauses, full carotids w/o T/B        Abdo soft +BS no bruit       Extrems w/o depend edema  Neuro MS:  Affect:  not anxious or sad w/ intact range no halluc, delus, or agitation   CN:  FEOM Face S&E intact   Motor:  strong w/o UE drift  mild symm paratonia subtle rest tremor Lt>Rt fingers MSRs: +2 w/o clonus or spread;  no Mcginnis's/grasp/PMR   Sens:  symm LT, cold, and vib face > hands > ankles      -Romberg,  symm sway <1cm      Coordin: FT fast w/ reg pace bilat     FNF accurate & symm to fixed target      Gait: nl pace, stride & armswing  turns 3.0 steps,   stable tandem   Cognitive:  RH   Language:  recept:  answers & follows    express: 3-5 word phrases,    rare full sents, naming: body=3/3   objects=5/5       Praxis:  intrans: hand shapes=3/3   transitive: pen twirling  Lt wnl & Rt wnl   Percep:  visual: traffic signs= 4/4,  imbedded objs (w/o clutter)=2/5     Atten: visual: Lt/Rt w/o DSSE   tactile: Lt/Rt hand contact w/o DSSE wnl    Mem: visspat: room topology (door, pc, prev room)=3/3    remote: USholidays=3/3    Exec:  Color-name Stroop: names=0/9 errors   colors= 7/9 errors  Prev Testing MoCA=22/30, c/w mild impairment  Labs (to 4-):  wbc=8.5,    hct=38.5,    mvs=906   Cologuard neg  glu=64, bun/crt=18/1.45,  GFR=51,     AST=19  ALT=9  AlkP=57   chol=266à231, hdl=46.9, dxs=225, chol/hdl=5.7, vldl=15, TT=012    TSH=1.67       fT4= B1=     B12=  Fol=  D3=      A1c=6.7à6.2  EKG (1-): NSR=75, w/ PVCs,  FJcE=925 Abnls: ?LAE, Nonspec ST-Ts (prevatrial flutter)  Head CT (8-):  GW diff intact; no mass or shift. Mild patchy hypoatten in perivent and subcort WM.  Mild atrophy.   Atherosclerotic calcifications suboptimally evaluated.  Probable lipoma Rt parietal scalp.  Mild ethmoid and maxillary sinus mucosal thickening. Partial opacification mastoid air cells of uncertain chronicity.    No definite acute intracranial findings.  A&P: History: Pt w/ 5y h/o memory difficulty prompting his stopping alcohol, marijuana, and smoking.  Genl exam w/ irregularly irregular cardiac rhythm w/o pt symptoms or awareness.  Neuro exam unchanged with less paratonia and less evident finger tremor.  Cognitive exam with good  sparse language recept and express func. Testing: prev MoCA=22/30 c/w mild impairment.  Labs are notable for a GFR=42à51, chol=266à231, ldl=198à169, and chol/hdl=5.7.  Head CT (2021) shows mild gyral atrophy and proportionate Vmegaly. Interpret: Pt with several years of progressive memory impairment, initially in the context of mixed substance use, and what has been called bipolar illness.  Presentation is c/w mild cognitive impairment. Plan: Labs GFR=42à51, TSH=167 EKG ND Brain MR ND.  F/U:  I discussed these matters with the pt and wife, that latter being more disclosing.  I will refer to Psychiatry for eval.  They asked questions and showed good understanding.  I repeatedly emphasized need for a cane or walking stick.  I will arrange RTC in 6 months.   Thank you for allowing me to participate in your care.   Sincerely yours, Devan Smith M.D., Ph.D.

## 2024-07-13 DIAGNOSIS — E11.9 TYPE 2 DIABETES MELLITUS WITHOUT COMPLICATION, WITHOUT LONG-TERM CURRENT USE OF INSULIN (MULTI): ICD-10-CM

## 2024-07-13 DIAGNOSIS — J42 CHRONIC BRONCHITIS, UNSPECIFIED CHRONIC BRONCHITIS TYPE (MULTI): ICD-10-CM

## 2024-07-13 DIAGNOSIS — I47.19 ATRIAL TACHYCARDIA (CMS-HCC): ICD-10-CM

## 2024-07-13 DIAGNOSIS — I10 BENIGN ESSENTIAL HYPERTENSION: ICD-10-CM

## 2024-07-15 RX ORDER — METOPROLOL SUCCINATE 25 MG/1
25 TABLET, EXTENDED RELEASE ORAL DAILY
Qty: 90 TABLET | Refills: 0 | Status: SHIPPED | OUTPATIENT
Start: 2024-07-15

## 2024-07-15 RX ORDER — LISINOPRIL AND HYDROCHLOROTHIAZIDE 12.5; 2 MG/1; MG/1
1 TABLET ORAL DAILY
Qty: 90 TABLET | Refills: 0 | Status: SHIPPED | OUTPATIENT
Start: 2024-07-15

## 2024-07-15 RX ORDER — ALBUTEROL SULFATE 90 UG/1
AEROSOL, METERED RESPIRATORY (INHALATION)
Qty: 54 G | Refills: 0 | Status: SHIPPED | OUTPATIENT
Start: 2024-07-15

## 2024-07-15 RX ORDER — GLIMEPIRIDE 1 MG/1
1 TABLET ORAL DAILY
Qty: 90 TABLET | Refills: 0 | Status: SHIPPED | OUTPATIENT
Start: 2024-07-15

## 2024-07-18 ENCOUNTER — APPOINTMENT (OUTPATIENT)
Dept: PRIMARY CARE | Facility: CLINIC | Age: 74
End: 2024-07-18
Payer: MEDICARE

## 2024-07-18 VITALS — BODY MASS INDEX: 28.48 KG/M2 | SYSTOLIC BLOOD PRESSURE: 110 MMHG | WEIGHT: 210 LBS | DIASTOLIC BLOOD PRESSURE: 70 MMHG

## 2024-07-18 DIAGNOSIS — E11.51 TYPE 2 DIABETES MELLITUS WITH DIABETIC PERIPHERAL ANGIOPATHY WITHOUT GANGRENE, WITHOUT LONG-TERM CURRENT USE OF INSULIN (MULTI): ICD-10-CM

## 2024-07-18 DIAGNOSIS — N18.31 STAGE 3A CHRONIC KIDNEY DISEASE (MULTI): ICD-10-CM

## 2024-07-18 DIAGNOSIS — I10 BENIGN ESSENTIAL HYPERTENSION: Primary | ICD-10-CM

## 2024-07-18 DIAGNOSIS — E78.5 HYPERLIPIDEMIA, UNSPECIFIED HYPERLIPIDEMIA TYPE: ICD-10-CM

## 2024-07-18 DIAGNOSIS — E78.00 PURE HYPERCHOLESTEROLEMIA: ICD-10-CM

## 2024-07-18 PROCEDURE — 1123F ACP DISCUSS/DSCN MKR DOCD: CPT | Performed by: INTERNAL MEDICINE

## 2024-07-18 PROCEDURE — 1158F ADVNC CARE PLAN TLK DOCD: CPT | Performed by: INTERNAL MEDICINE

## 2024-07-18 PROCEDURE — 3044F HG A1C LEVEL LT 7.0%: CPT | Performed by: INTERNAL MEDICINE

## 2024-07-18 PROCEDURE — 3078F DIAST BP <80 MM HG: CPT | Performed by: INTERNAL MEDICINE

## 2024-07-18 PROCEDURE — 3050F LDL-C >= 130 MG/DL: CPT | Performed by: INTERNAL MEDICINE

## 2024-07-18 PROCEDURE — 3074F SYST BP LT 130 MM HG: CPT | Performed by: INTERNAL MEDICINE

## 2024-07-18 PROCEDURE — 1160F RVW MEDS BY RX/DR IN RCRD: CPT | Performed by: INTERNAL MEDICINE

## 2024-07-18 PROCEDURE — G2211 COMPLEX E/M VISIT ADD ON: HCPCS | Performed by: INTERNAL MEDICINE

## 2024-07-18 PROCEDURE — 1159F MED LIST DOCD IN RCRD: CPT | Performed by: INTERNAL MEDICINE

## 2024-07-18 PROCEDURE — 99214 OFFICE O/P EST MOD 30 MIN: CPT | Performed by: INTERNAL MEDICINE

## 2024-07-18 RX ORDER — EZETIMIBE 10 MG/1
10 TABLET ORAL DAILY
Qty: 90 TABLET | Refills: 1 | Status: SHIPPED | OUTPATIENT
Start: 2024-07-18 | End: 2025-01-14

## 2024-08-02 ENCOUNTER — APPOINTMENT (OUTPATIENT)
Dept: RADIOLOGY | Facility: HOSPITAL | Age: 74
End: 2024-08-02
Payer: MEDICARE

## 2024-08-03 ENCOUNTER — HOSPITAL ENCOUNTER (OUTPATIENT)
Dept: RADIOLOGY | Facility: HOSPITAL | Age: 74
Discharge: HOME | End: 2024-08-03
Payer: MEDICARE

## 2024-08-03 DIAGNOSIS — R41.3 MEMORY CHANGE: ICD-10-CM

## 2024-08-03 PROCEDURE — 70551 MRI BRAIN STEM W/O DYE: CPT | Performed by: RADIOLOGY

## 2024-08-03 PROCEDURE — 70551 MRI BRAIN STEM W/O DYE: CPT

## 2024-08-06 ENCOUNTER — APPOINTMENT (OUTPATIENT)
Dept: RADIOLOGY | Facility: HOSPITAL | Age: 74
End: 2024-08-06
Payer: MEDICARE

## 2024-08-14 ENCOUNTER — APPOINTMENT (OUTPATIENT)
Dept: RADIOLOGY | Facility: HOSPITAL | Age: 74
End: 2024-08-14
Payer: MEDICARE

## 2024-08-29 ENCOUNTER — HOSPITAL ENCOUNTER (EMERGENCY)
Facility: HOSPITAL | Age: 74
Discharge: OTHER INSTITUTION | End: 2024-08-29
Payer: MEDICARE

## 2024-08-29 PROCEDURE — 4500999001 HC ED NO CHARGE

## 2024-09-01 DIAGNOSIS — I47.19 ATRIAL TACHYCARDIA (CMS-HCC): ICD-10-CM

## 2024-09-03 RX ORDER — CLOPIDOGREL BISULFATE 75 MG/1
75 TABLET ORAL DAILY
Qty: 90 TABLET | Refills: 0 | Status: SHIPPED | OUTPATIENT
Start: 2024-09-03

## 2024-09-04 ENCOUNTER — HOSPITAL ENCOUNTER (EMERGENCY)
Facility: HOSPITAL | Age: 74
Discharge: HOME | End: 2024-09-04
Payer: MEDICARE

## 2024-09-04 VITALS
TEMPERATURE: 99.3 F | SYSTOLIC BLOOD PRESSURE: 155 MMHG | BODY MASS INDEX: 28.44 KG/M2 | WEIGHT: 210 LBS | OXYGEN SATURATION: 98 % | HEART RATE: 63 BPM | RESPIRATION RATE: 18 BRPM | HEIGHT: 72 IN | DIASTOLIC BLOOD PRESSURE: 83 MMHG

## 2024-09-04 DIAGNOSIS — A64 STD (MALE): Primary | ICD-10-CM

## 2024-09-04 DIAGNOSIS — F19.10 DRUG ABUSE (MULTI): ICD-10-CM

## 2024-09-04 LAB
AMPHETAMINES UR QL SCN: NORMAL
APPEARANCE UR: CLEAR
BARBITURATES UR QL SCN: NORMAL
BENZODIAZ UR QL SCN: NORMAL
BILIRUB UR STRIP.AUTO-MCNC: NEGATIVE MG/DL
BZE UR QL SCN: NORMAL
CANNABINOIDS UR QL SCN: NORMAL
COLOR UR: ABNORMAL
FENTANYL+NORFENTANYL UR QL SCN: NORMAL
GLUCOSE UR STRIP.AUTO-MCNC: NORMAL MG/DL
KETONES UR STRIP.AUTO-MCNC: NEGATIVE MG/DL
LEUKOCYTE ESTERASE UR QL STRIP.AUTO: NEGATIVE
METHADONE UR QL SCN: NORMAL
NITRITE UR QL STRIP.AUTO: NEGATIVE
OPIATES UR QL SCN: NORMAL
OXYCODONE+OXYMORPHONE UR QL SCN: NORMAL
PCP UR QL SCN: NORMAL
PH UR STRIP.AUTO: 5.5 [PH]
PROT UR STRIP.AUTO-MCNC: ABNORMAL MG/DL
RBC # UR STRIP.AUTO: NEGATIVE /UL
RBC #/AREA URNS AUTO: NORMAL /HPF
SP GR UR STRIP.AUTO: 1.01
UROBILINOGEN UR STRIP.AUTO-MCNC: NORMAL MG/DL
WBC #/AREA URNS AUTO: NORMAL /HPF

## 2024-09-04 PROCEDURE — 81001 URINALYSIS AUTO W/SCOPE: CPT | Performed by: NURSE PRACTITIONER

## 2024-09-04 PROCEDURE — 80307 DRUG TEST PRSMV CHEM ANLYZR: CPT | Performed by: NURSE PRACTITIONER

## 2024-09-04 PROCEDURE — 87491 CHLMYD TRACH DNA AMP PROBE: CPT | Mod: AHULAB | Performed by: NURSE PRACTITIONER

## 2024-09-04 PROCEDURE — 99283 EMERGENCY DEPT VISIT LOW MDM: CPT

## 2024-09-04 ASSESSMENT — COLUMBIA-SUICIDE SEVERITY RATING SCALE - C-SSRS
2. HAVE YOU ACTUALLY HAD ANY THOUGHTS OF KILLING YOURSELF?: NO
1. IN THE PAST MONTH, HAVE YOU WISHED YOU WERE DEAD OR WISHED YOU COULD GO TO SLEEP AND NOT WAKE UP?: NO
6. HAVE YOU EVER DONE ANYTHING, STARTED TO DO ANYTHING, OR PREPARED TO DO ANYTHING TO END YOUR LIFE?: NO

## 2024-09-04 NOTE — ED PROVIDER NOTES
HPI   Chief Complaint   Patient presents with    drug test    Exposure to STD       73-year-old male was missing from his home for 4 hours this morning and wife is concerned that he was doing drugs again and fooling around on her.  She brought her to our emergency department to be checked for STDs and drug abuse.  Patient is indicating that he smokes marijuana and on occasion used crack cocaine.  He denies intercourse this morning.  He has no other cause for concern or complaint.  All vital signs were normal and stable in triage.  He is denying fever, pharyngitis, skin rash, chest pain, dyspnea, abdominal pain, nausea or vomiting.  He has no other cause for concern or complaint.  He denies any neurologic deficit.      History provided by:  Patient and spouse   used: No            Patient History   Past Medical History:   Diagnosis Date    Abnormal weight loss 04/25/2015    Weight loss    Acute upper respiratory infection, unspecified 01/04/2014    Acute upper respiratory infection    Encounter for immunization 09/18/2020    Need for prophylactic vaccination and inoculation against influenza    Encounter for screening for malignant neoplasm of prostate 04/25/2015    Screening for prostate cancer    Encounter for screening for malignant neoplasm of prostate 06/29/2013    Encounter for screening for malignant neoplasm of prostate    Personal history of other diseases of urinary system 06/29/2013    History of acute renal failure    Personal history of other endocrine, nutritional and metabolic disease     History of high cholesterol     Past Surgical History:   Procedure Laterality Date    COLONOSCOPY  03/08/2013    Complete Colonoscopy    OTHER SURGICAL HISTORY  11/01/2019    Aortobifemoral bypass     No family history on file.  Social History     Tobacco Use    Smoking status: Former     Current packs/day: 0.00     Average packs/day: 1 pack/day for 25.0 years (25.0 ttl pk-yrs)     Types:  Cigarettes     Start date:      Quit date:      Years since quittin.6     Passive exposure: Past    Smokeless tobacco: Never   Substance Use Topics    Alcohol use: Not Currently     Comment: stopped in     Drug use: Not Currently     Comment: quit long time ago       Physical Exam   ED Triage Vitals [24 1325]   Temperature Heart Rate Respirations BP   37.4 °C (99.3 °F) 63 18 155/83      Pulse Ox Temp Source Heart Rate Source Patient Position   98 % Temporal -- --      BP Location FiO2 (%)     -- --       Physical Exam  Constitutional:       Appearance: Normal appearance.   HENT:      Head: Normocephalic and atraumatic.      Right Ear: Tympanic membrane normal.      Left Ear: Tympanic membrane normal.      Nose: Nose normal.      Mouth/Throat:      Mouth: Mucous membranes are moist.   Eyes:      Extraocular Movements: Extraocular movements intact.      Pupils: Pupils are equal, round, and reactive to light.   Cardiovascular:      Rate and Rhythm: Normal rate and regular rhythm.      Pulses: Normal pulses.      Heart sounds: Normal heart sounds.   Pulmonary:      Effort: Pulmonary effort is normal.      Breath sounds: Normal breath sounds.   Abdominal:      General: Abdomen is flat.      Palpations: Abdomen is soft.   Genitourinary:     Penis: Normal.       Testes: Normal.      Comments: I did not appreciate any rash to the groin or penis.  I did not appreciate any penile discharge.  There was no tenderness during palpation  Musculoskeletal:         General: Normal range of motion.      Cervical back: Normal range of motion and neck supple.   Skin:     General: Skin is warm.      Capillary Refill: Capillary refill takes less than 2 seconds.      Findings: No erythema, lesion or rash.   Neurological:      General: No focal deficit present.      Mental Status: He is alert and oriented to person, place, and time.   Psychiatric:         Mood and Affect: Mood normal.         Behavior: Behavior normal.            ED Course & MDM   Diagnoses as of 09/04/24 1410   STD (male)   Drug abuse (Multi)                 No data recorded                                 Medical Decision Making  Patient did not appear to be in acute distress.  I asked for a urine tox and STD by urinalysis.  I advised results will not be completed for a few days and they should use MyChart and I explained how to go on MyChart.  Patient received information on drug abuse and STDs.  Return precautions reviewed and safely discharged home.  I offered to treat patient prophylactically for STDs and at this time he declined indicating that he did not have intercourse this morning.    Amount and/or Complexity of Data Reviewed  Labs: ordered.        Procedure  Procedures     García Harding, DARNELL-CNP  09/04/24 1410

## 2024-09-05 LAB — HOLD SPECIMEN: NORMAL

## 2024-09-06 LAB
C TRACH RRNA SPEC QL NAA+PROBE: NEGATIVE
N GONORRHOEA DNA SPEC QL PROBE+SIG AMP: NEGATIVE

## 2024-09-10 ENCOUNTER — TELEPHONE (OUTPATIENT)
Dept: PRIMARY CARE | Facility: CLINIC | Age: 74
End: 2024-09-10
Payer: MEDICARE

## 2024-09-11 DIAGNOSIS — E78.00 PURE HYPERCHOLESTEROLEMIA: ICD-10-CM

## 2024-09-12 ENCOUNTER — APPOINTMENT (OUTPATIENT)
Dept: PRIMARY CARE | Facility: CLINIC | Age: 74
End: 2024-09-12
Payer: MEDICARE

## 2024-09-12 RX ORDER — ATORVASTATIN CALCIUM 80 MG/1
80 TABLET, FILM COATED ORAL NIGHTLY
Qty: 90 TABLET | Refills: 0 | Status: SHIPPED | OUTPATIENT
Start: 2024-09-12

## 2024-10-06 DIAGNOSIS — I10 BENIGN ESSENTIAL HYPERTENSION: ICD-10-CM

## 2024-10-06 DIAGNOSIS — I47.19 ATRIAL TACHYCARDIA (CMS-HCC): ICD-10-CM

## 2024-10-06 DIAGNOSIS — E11.9 TYPE 2 DIABETES MELLITUS WITHOUT COMPLICATION, WITHOUT LONG-TERM CURRENT USE OF INSULIN (MULTI): ICD-10-CM

## 2024-10-06 DIAGNOSIS — J42 CHRONIC BRONCHITIS, UNSPECIFIED CHRONIC BRONCHITIS TYPE (MULTI): ICD-10-CM

## 2024-10-08 ENCOUNTER — APPOINTMENT (OUTPATIENT)
Dept: PRIMARY CARE | Facility: CLINIC | Age: 74
End: 2024-10-08
Payer: MEDICARE

## 2024-10-08 VITALS
HEART RATE: 76 BPM | WEIGHT: 207 LBS | SYSTOLIC BLOOD PRESSURE: 146 MMHG | BODY MASS INDEX: 28.04 KG/M2 | HEIGHT: 72 IN | DIASTOLIC BLOOD PRESSURE: 74 MMHG

## 2024-10-08 DIAGNOSIS — F30.9 MANIA (MULTI): ICD-10-CM

## 2024-10-08 DIAGNOSIS — Z23 FLU VACCINE NEED: ICD-10-CM

## 2024-10-08 DIAGNOSIS — F19.20 ADDICTION TO DRUG (MULTI): Primary | ICD-10-CM

## 2024-10-08 PROCEDURE — 1159F MED LIST DOCD IN RCRD: CPT | Performed by: INTERNAL MEDICINE

## 2024-10-08 PROCEDURE — 99214 OFFICE O/P EST MOD 30 MIN: CPT | Performed by: INTERNAL MEDICINE

## 2024-10-08 PROCEDURE — 3008F BODY MASS INDEX DOCD: CPT | Performed by: INTERNAL MEDICINE

## 2024-10-08 PROCEDURE — 1160F RVW MEDS BY RX/DR IN RCRD: CPT | Performed by: INTERNAL MEDICINE

## 2024-10-08 PROCEDURE — G0008 ADMIN INFLUENZA VIRUS VAC: HCPCS | Performed by: INTERNAL MEDICINE

## 2024-10-08 PROCEDURE — 1123F ACP DISCUSS/DSCN MKR DOCD: CPT | Performed by: INTERNAL MEDICINE

## 2024-10-08 PROCEDURE — 90662 IIV NO PRSV INCREASED AG IM: CPT | Performed by: INTERNAL MEDICINE

## 2024-10-08 PROCEDURE — 3077F SYST BP >= 140 MM HG: CPT | Performed by: INTERNAL MEDICINE

## 2024-10-08 PROCEDURE — 1158F ADVNC CARE PLAN TLK DOCD: CPT | Performed by: INTERNAL MEDICINE

## 2024-10-08 PROCEDURE — 1036F TOBACCO NON-USER: CPT | Performed by: INTERNAL MEDICINE

## 2024-10-08 PROCEDURE — 3078F DIAST BP <80 MM HG: CPT | Performed by: INTERNAL MEDICINE

## 2024-10-08 PROCEDURE — 3044F HG A1C LEVEL LT 7.0%: CPT | Performed by: INTERNAL MEDICINE

## 2024-10-08 PROCEDURE — 3050F LDL-C >= 130 MG/DL: CPT | Performed by: INTERNAL MEDICINE

## 2024-10-08 RX ORDER — GLIMEPIRIDE 1 MG/1
1 TABLET ORAL DAILY
Qty: 90 TABLET | Refills: 0 | Status: SHIPPED | OUTPATIENT
Start: 2024-10-08

## 2024-10-08 RX ORDER — LISINOPRIL AND HYDROCHLOROTHIAZIDE 12.5; 2 MG/1; MG/1
1 TABLET ORAL DAILY
Qty: 90 TABLET | Refills: 0 | Status: SHIPPED | OUTPATIENT
Start: 2024-10-08

## 2024-10-08 RX ORDER — ALBUTEROL SULFATE 90 UG/1
INHALANT RESPIRATORY (INHALATION)
Qty: 34 G | Refills: 0 | Status: SHIPPED | OUTPATIENT
Start: 2024-10-08

## 2024-10-08 RX ORDER — METOPROLOL SUCCINATE 25 MG/1
25 TABLET, EXTENDED RELEASE ORAL DAILY
Qty: 90 TABLET | Refills: 0 | Status: SHIPPED | OUTPATIENT
Start: 2024-10-08

## 2024-10-08 ASSESSMENT — LIFESTYLE VARIABLES
HOW OFTEN DO YOU HAVE SIX OR MORE DRINKS ON ONE OCCASION: NEVER
SKIP TO QUESTIONS 9-10: 1
HOW MANY STANDARD DRINKS CONTAINING ALCOHOL DO YOU HAVE ON A TYPICAL DAY: PATIENT DOES NOT DRINK
AUDIT-C TOTAL SCORE: 0
HOW OFTEN DO YOU HAVE A DRINK CONTAINING ALCOHOL: NEVER

## 2024-10-08 NOTE — PROGRESS NOTES
Primary care office Note      Name: Brendan Rob, Age: 73 y.o., Gender: male, MRN: [id number]   Pharmacy:   [facility] - [place], [place] - [address]  [address]  [facility] [address]  [facility] [address]  Phone: [phone] Fax: [phone]     PCP: Anna Arana        Subjective:   Chief Complaint   Patient presents with    Follow-up     Memory loss / flu shot        Brendan Rob is a 73 y.o. male who presented to the clinic today for follow up     HPI:   Brendan Rob is a 73 y.o. male  Pt presents with wife who has major concerns.  Per wife, patient has been sleeping with other women outside of their marriage and has been using viagra that he has been buying off the street. He has been using marijuana and cocaine. He has been lying to her and his family, as well as taking money from them for drugs and alcohol. He is here today to seek help. He would like to see psychiatry for help for these issues.  Recently wife made him go to the ER for eval and testing was all negative. He states the he has not used any drugs for a couple of months.  Per wife, patient took son to work at 6 am and didn't get home for 3-4 hours. He told her he got lost. She doesn't believe him.   Pt admits to being depressed.     The patient denies headaches, lightheadedness, changes in speech/vision, photophobia, dysphagia, diaphoresis, Chest pain, dyspnea, Abdominal pain, recent falls or trauma, and changes in bowel/urinary habits.     ROS:   12 points review of system is negative excepted as stated above in the HPI.    Medical History      PMH:    has a past medical history of Abnormal weight loss (04/25/2015), Acute upper respiratory infection, unspecified (01/04/2014), Encounter for immunization (09/18/2020), Encounter for screening for malignant neoplasm of prostate (04/25/2015), Encounter for screening for malignant neoplasm of prostate (06/29/2013), Personal history of other diseases of urinary  system (2013), and Personal history of other endocrine, nutritional and metabolic disease.   Allergies:   No Known Allergies   Surgical Hx:   Past Surgical History:   Procedure Laterality Date    COLONOSCOPY  2013    Complete Colonoscopy    OTHER SURGICAL HISTORY  2019    Aortobifemoral bypass      Social HX:   Social History     Tobacco Use    Smoking status: Former     Current packs/day: 0.00     Average packs/day: 1 pack/day for 25.0 years (25.0 ttl pk-yrs)     Types: Cigarettes     Start date:      Quit date:      Years since quittin.7     Passive exposure: Past    Smokeless tobacco: Never   Substance Use Topics    Alcohol use: Not Currently     Comment: stopped in     Drug use: Not Currently     Comment: quit long time ago        MEDS:   Current Outpatient Medications   Medication Instructions    albuterol 90 mcg/actuation inhaler USE 2 INHALATIONS BY MOUTH EVERY 6 HOURS AS NEEDED FOR WHEEZING    atorvastatin (LIPITOR) 80 mg, oral, Nightly    blood sugar diagnostic (Blood Glucose Test) strip USE AS DIRECTED.    clopidogrel (PLAVIX) 75 mg, oral, Daily    ezetimibe (ZETIA) 10 mg, oral, Daily    glimepiride (AMARYL) 1 mg, oral, Daily    insulin lispro (HumaLOG) 100 unit/mL injection injectable 3 times a day    lisinopriL-hydrochlorothiazide 20-12.5 mg tablet 1 tablet, oral, Daily    metoprolol succinate XL (TOPROL-XL) 25 mg, oral, Daily, DO NOT CRUSH OR CHEW        Objective Data     Objective:   Visit Vitals  /74 (BP Location: Right arm, Patient Position: Sitting, BP Cuff Size: Large adult)   Pulse 76        Physical Examination:   GE; sitting comfortably in a chair, in NAD  HEENT; AT/NC, no conjunctival pallor, anicteric sclera  Neck; Supple neck, no LAD/JVD  Chest; CTAbl, no adventitious sounds  CVS; s1 and s2 only no MGR, RRR  Ext; no cyanosis/clubbing/edema, pulses intact  Neuro; Aox3  Psych; normal mood     Last Labs:   CBC:   WBC   Date Value Ref Range Status    04/18/2024 8.5 4.4 - 11.3 x10*3/uL Final   10/10/2023 9.0 4.4 - 11.3 x10*3/uL Final   04/20/2023 6.4 4.4 - 11.3 x10E9/L Final     Hemoglobin   Date Value Ref Range Status   04/18/2024 12.7 (L) 13.5 - 17.5 g/dL Final   10/10/2023 14.2 13.5 - 17.5 g/dL Final   04/20/2023 15.2 13.5 - 17.5 g/dL Final     MCV   Date Value Ref Range Status   04/18/2024 88 80 - 100 fL Final   10/10/2023 90 80 - 100 fL Final   04/20/2023 87 80 - 100 fL Final     Platelets   Date Value Ref Range Status   04/18/2024 387 150 - 450 x10*3/uL Final   10/10/2023 311 150 - 450 x10*3/uL Final   04/20/2023 269 150 - 450 x10E9/L Final      CMP:   Sodium   Date Value Ref Range Status   04/18/2024 142 136 - 145 mmol/L Final   10/10/2023 141 136 - 145 mmol/L Final   04/20/2023 137 136 - 145 mmol/L Final     Potassium   Date Value Ref Range Status   04/18/2024 4.5 3.5 - 5.3 mmol/L Final   10/10/2023 5.0 3.5 - 5.3 mmol/L Final   04/20/2023 4.4 3.5 - 5.3 mmol/L Final     Chloride   Date Value Ref Range Status   04/18/2024 109 (H) 98 - 107 mmol/L Final   10/10/2023 109 (H) 98 - 107 mmol/L Final   04/20/2023 106 98 - 107 mmol/L Final     Urea Nitrogen   Date Value Ref Range Status   04/18/2024 18 6 - 23 mg/dL Final   10/10/2023 22 6 - 23 mg/dL Final   04/20/2023 15 6 - 23 mg/dL Final     Creatinine   Date Value Ref Range Status   04/18/2024 1.45 (H) 0.50 - 1.30 mg/dL Final   10/10/2023 1.71 (H) 0.50 - 1.30 mg/dL Final   04/20/2023 1.39 (H) 0.50 - 1.30 mg/dL Final     eGFR   Date Value Ref Range Status   04/18/2024 51 (L) >60 mL/min/1.73m*2 Final     Comment:     Calculations of estimated GFR are performed using the 2021 CKD-EPI Study Refit equation without the race variable for the IDMS-Traceable creatinine methods.  https://jasn.asnjournals.org/content/early/2021/09/22/ASN.1779693726   10/10/2023 42 (L) >60 mL/min/1.73m*2 Final     Comment:     Calculations of estimated GFR are performed using the 2021 CKD-EPI Study Refit equation without the race variable  "for the IDMS-Traceable creatinine methods.  https://jasn.asnjournals.org/content/early/2021/09/22/ASN.2216574789      A1c:   Hemoglobin A1C   Date Value Ref Range Status   04/18/2024 6.2 (H) see below % Final   04/20/2023 6.7 (A) % Final     Comment:          Diagnosis of Diabetes-Adults   Non-Diabetic: < or = 5.6%   Increased risk for developing diabetes: 5.7-6.4%   Diagnostic of diabetes: > or = 6.5%  .       Monitoring of Diabetes                Age (y)     Therapeutic Goal (%)   Adults:          >18           <7.0   Pediatrics:    13-18           <7.5                   7-12           <8.0                   0- 6            7.5-8.5   American Diabetes Association. Diabetes Care 33(S1), Jan 2010.   Hemoglobin variant detected which does not interfere    with determination of Hemoglobin A1c. Hemoglobin   identification can be ordered to characterize the variant   if clinically indicated.   02/01/2022 6.8 (A) % Final     Comment:          Diagnosis of Diabetes-Adults   Non-Diabetic: < or = 5.6%   Increased risk for developing diabetes: 5.7-6.4%   Diagnostic of diabetes: > or = 6.5%  .       Monitoring of Diabetes                Age (y)     Therapeutic Goal (%)   Adults:          >18           <7.0   Pediatrics:    13-18           <7.5                   7-12           <8.0                   0- 6            7.5-8.5   American Diabetes Association. Diabetes Care 33(S1), Jan 2010.   Hemoglobin variant detected which does not interfere    with determination of Hemoglobin A1c. Hemoglobin   identification can be ordered to characterize the variant   if clinically indicated.          Other labs;   Vit D:   No results found for: \"VITD25\"   TSH:  Thyroid Stimulating Hormone   Date Value Ref Range Status   04/18/2024 1.67 0.44 - 3.98 mIU/L Final     TSH   Date Value Ref Range Status   04/20/2023 1.24 0.44 - 3.98 mIU/L Final     Comment:      TSH testing is performed using different testing    methodology at [facility]" [place] than at other    system [facility]. Direct result comparisons should    only be made within the same method.   01/11/2022 0.44 0.44 - 3.98 mIU/L Final     Comment:      TSH testing is performed using different testing    methodology at [facility] than at other    [facility]. Direct result comparisons should    only be made within the same method.          Assessment and Plan     Problem List Items Addressed This Visit    None  Visit Diagnoses       Addiction to drug (Multi)    -  Primary    Relevant Orders    Referral to [facility]    Flu vaccine need        Relevant Orders    Flu vaccine, trivalent, preservative free, HIGH-DOSE, age 65y+ (Fluzone) (Completed)    Tiarra (Multi)        Relevant Orders    Referral to [facility]            Anna Arana, DO

## 2024-10-15 DIAGNOSIS — F32.A DEPRESSION, UNSPECIFIED DEPRESSION TYPE: ICD-10-CM

## 2024-10-15 DIAGNOSIS — F19.20 ADDICTION TO DRUG (MULTI): Primary | ICD-10-CM

## 2024-10-21 ENCOUNTER — APPOINTMENT (OUTPATIENT)
Dept: BEHAVIORAL HEALTH | Facility: CLINIC | Age: 74
End: 2024-10-21
Payer: MEDICARE

## 2024-10-21 DIAGNOSIS — F10.20 ALCOHOL USE DISORDER, SEVERE: ICD-10-CM

## 2024-10-21 DIAGNOSIS — F19.20 ADDICTION TO DRUG (MULTI): ICD-10-CM

## 2024-10-21 DIAGNOSIS — F32.A DEPRESSION, UNSPECIFIED DEPRESSION TYPE: ICD-10-CM

## 2024-10-21 LAB
AMPHETAMINES UR QL SCN: NORMAL
BARBITURATES UR QL SCN: NORMAL
BENZODIAZ UR QL SCN: NORMAL
BZE UR QL SCN: NORMAL
CANNABINOIDS UR QL SCN: NORMAL
FENTANYL+NORFENTANYL UR QL SCN: NORMAL
METHADONE UR QL SCN: NORMAL
OPIATES UR QL SCN: NORMAL
OXYCODONE+OXYMORPHONE UR QL SCN: NORMAL
PCP UR QL SCN: NORMAL

## 2024-10-21 PROCEDURE — 80346 BENZODIAZEPINES1-12: CPT | Performed by: NURSE PRACTITIONER

## 2024-10-21 PROCEDURE — 80307 DRUG TEST PRSMV CHEM ANLYZR: CPT | Mod: MUE | Performed by: NURSE PRACTITIONER

## 2024-10-21 PROCEDURE — 3050F LDL-C >= 130 MG/DL: CPT | Performed by: COUNSELOR

## 2024-10-21 PROCEDURE — 80307 DRUG TEST PRSMV CHEM ANLYZR: CPT | Performed by: COUNSELOR

## 2024-10-21 PROCEDURE — 90791 PSYCH DIAGNOSTIC EVALUATION: CPT | Performed by: COUNSELOR

## 2024-10-21 PROCEDURE — 3044F HG A1C LEVEL LT 7.0%: CPT | Performed by: COUNSELOR

## 2024-10-21 PROCEDURE — 80365 DRUG SCREENING OXYCODONE: CPT | Performed by: NURSE PRACTITIONER

## 2024-10-21 NOTE — PROGRESS NOTES
"ARS ASSESSMENT      NAME: Brendan Rob  MRN: [id number]  DATE: 10/21/24      Reason for Visit: The patient is here at the behest of his wife. They both report that he is using crack cocaine and having sex with many partners.  The wife's chief complaint is the patients \"lying and stealing her money\". No chief complaint on file.      Diagnoses/Problems:  Patient Active Problem List   Diagnosis    Abnormal respirations    Abnormal chest CT    Anemia    Asthma    Atrial tachycardia (CMS-[facility])    New onset atrial fibrillation (Multi)    Paroxysmal atrial fibrillation (Multi)    Paroxysmal SVT (supraventricular tachycardia) (CMS-[facility])    Benign essential hypertension    Callus of foot    Chronic cough    Cognitive changes    Mild cognitive impairment    COPD (chronic obstructive pulmonary disease) (Multi)    COVID-19 virus infection    D-dimer, elevated    Diabetes mellitus (Multi)    PALACIOS (dyspnea on exertion)    Lower abdominal pain    Memory loss    Memory loss, short term    Depression    Mood disorder (CMS-[facility])    Organic impotence    Peripheral vascular disease (CMS-HCC)    Pure hypercholesterolemia    Right foot drop    Weakness generalized    Chronic respiratory failure    History of substance dependence (Multi)    Type 2 diabetes mellitus with diabetic peripheral angiopathy without gangrene, without long-term current use of insulin (Multi)    Stage 3a chronic kidney disease (Multi)      Provider Impression: The patient has a very long history of crack cocaine and cannabis addiction. He also admits to having sex with other women and being completely dishonest with his wife.  The wife is very overwhelmed and wants Brendan \"put away in a home\". He alleges that he has not used cocaine or cannabis in a month or two. But his dishonesty and sexual activity have not ceased. After a long session, I recommended that he start attending [facility] meetings immediately, if he is willing to abstain from the sexual activity. I gave them " "a local meeting list. I also gave them written information about two residential addiction programs in [place] ([facility]). They acknowledged that they would contact the programs to inquire about residential treatment for Brendan. Brendan is superficially compliant and it is not clear if he will follow through with anything.      Level of Care Assessment:  D1: Acute Intx/Withdrawal Potential: 2  D2: Biomedical Conditions/Complications: 2  D3: Emtional Behavioral/Cog. Conditions Complications: 0  D4: Treatment Acceptance/Resistance: 2  D5: Relapse/Cont. Use/Cont. Problem Potential: 2  D6: Recovery Environment: 2    Level of Care Recommended: Recommended LOC Residential, non medical   Level of Care Placed: pending     Comments:     Readiness For Treatment:  pre-contemplation    Substance Use History:  Alcohol The patient is very unclear about his alcohol use history.  However, he and his wife both confirm that he has not been consuming alcohol for about two years.   Cocaine The patient states that he first used crack cocaine about 30 years ago. He has been a frequent and heavy user since then. He states that he tends to put the rocks of crack in marijuana cigarettes and smoke it that way. He has never stopped using before this. Today, he alleges that it has been a couple of months since his last use of crack. His wife states tat she does not know when he is using because he is \"out in the streets\".   Marijuana The patient has been a daily smoker of cannabis for about 40 years. He admits to being addicted to it.  He alleges that he has not smoked for a couple of months.     Impact on Daily Life: home disruption and law involvement    History of Treatment:  No    Other Behaviors:  sexual behavior    Past Psychiatric History:  He denies that he has ever had any mental health treatment of any kind.  He denies that he has ever had any suicidal ideation.    Suicidal Ideation:  No  Suicidal " "Attempts:  No  Suicide Protective Factors:  no prior history of attempts  Neuropsychological Factors:  cognitive deficits    Psych Social History ARS:  Born in [place]. Raised in [place] by both parents. Had 5 siblings. One brother is addicted to crack and cannabis.  He denies any history of abuse in his life. He graduated from high school. 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  He is  to his second wife for 40 years. She is here today.  They have one child together. Both have children from prior relationships.   Abuse/Neglect History:  Denies  Relationship History:  currently in a relationship  Sexual History:  Sexual orientation hetero   Education:  last grade completed High School     Employment History:  not currently employed  Place of employment       History:  no history of  affiliation  Legal History:  Arrested for disorderly conduct 10 years ago. Arrested for theft, 20 years ago.    Financial Stressors:    Cultural/Mandaeism/Spiritual Orientation:  not currently active in their Cheondoism/spiritual affiliation  Leisure/Recreation/Hobbies:    Collateral Information:    Past Medical History:  History    Surgical History:  Past Surgical History:   Procedure Laterality Date    COLONOSCOPY  03/08/2013    Complete Colonoscopy    OTHER SURGICAL HISTORY  11/01/2019    Aortobifemoral bypass     Family History:  No family history on file.  Allergies:  No Known Allergies  Current Meds:    Current Outpatient Medications:     albuterol 90 mcg/actuation inhaler, USE 2 INHALATIONS BY MOUTH EVERY 6 HOURS AS NEEDED FOR WHEEZING, Disp: 34 g, Rfl: 0    atorvastatin (Lipitor) 80 mg tablet, TAKE 1 TABLET BY MOUTH ONCE  DAILY AT BEDTIME, Disp: 90 tablet, Rfl: 0    blood sugar diagnostic (Blood Glucose Test) strip, USE AS DIRECTED., Disp: 30 strip, Rfl: 2    clopidogrel (Plavix) 75 mg tablet, TAKE 1 TABLET BY MOUTH ONCE  DAILY, Disp: 90 tablet, Rfl: 0    ezetimibe " (Zetia) 10 mg tablet, Take 1 tablet (10 mg) by mouth once daily., Disp: 90 tablet, Rfl: 1    glimepiride (Amaryl) 1 mg tablet, TAKE 1 TABLET BY MOUTH ONCE  DAILY, Disp: 90 tablet, Rfl: 0    insulin lispro (HumaLOG) 100 unit/mL injection, injectable 3 times a day, Disp: , Rfl:     lisinopriL-hydrochlorothiazide 20-12.5 mg tablet, TAKE 1 TABLET BY MOUTH ONCE  DAILY, Disp: 90 tablet, Rfl: 0    metoprolol succinate XL (Toprol-XL) 25 mg 24 hr tablet, TAKE 1 TABLET BY MOUTH ONCE  DAILY DO NOT CRUSH OR CHEW, Disp: 90 tablet, Rfl: 0   Vitals:  There is no height or weight on file to calculate BSA.    Mental Status Exam:  General Appearance: disheveled  Attitude/Behavior: superficially cooperative  Motor: no psychomotor agitation or retardation, no tremor or other abnormal movements  Speech: normal rate, volume, prosody  Gait/Station: stiff  Mood: depressed  Affect: dysphoric, constricted  Thought Process: linear, goal directed  Thought Associations: no loosening of associations  Thought Content: normal  Perception: no perceptual abnormalities noted  Sensorium: alert and oriented to person, place, time and situation  Insight: limited  Judgment: limited  Cognition: cognitively intact to conversational testing with respect to attention, orientation, fund of knowledge, recent and remote memory, and language  Testing: N/A      Pain Scale:   NA  Pain Quality:  Patient unable to rate his pain today    Does your pain make it hard to do any of these things that you normally do?  NA  Vitals:  There is no height or weight on file to calculate BSA.    Tobacco Screening:   Social History     Tobacco Use   Smoking Status Former    Current packs/day: 0.00    Average packs/day: 1 pack/day for 25.0 years (25.0 ttl pk-yrs)    Types: Cigarettes    Start date:     Quit date: 2020    Years since quittin.8    Passive exposure: Past   Smokeless Tobacco Never       Domestic Violence:      Elder Abuse:  No   Depression/Suicide Screening:       CSSR-S Score: Negative     PHQ-9 Score: NA    LAUREEN-7 Score: NA    Nutrition Screening:    Social Determinates of Health:  Social Drivers of Health     Tobacco Use: Medium Risk (10/8/2024)    Patient History     Smoking Tobacco Use: Former     Smokeless Tobacco Use: Never     Passive Exposure: Past   Alcohol Use: Not At Risk (10/8/2024)    AUDIT-C     Frequency of Alcohol Consumption: Never     Average Number of Drinks: Patient does not drink     Frequency of Binge Drinking: Never   Financial Resource Strain: Not on file   Food Insecurity: Not on file   Transportation Needs: Not on file   Physical Activity: Not on file   Stress: Not on file   Social Connections: Not on file   Intimate Partner Violence: Not on file   Depression: Not at risk (10/8/2024)    PHQ-2     PHQ-2 Score: 0   Housing Stability: Not on file   Utilities: Not on file   Digital Equity: Not on file   Health Literacy: Not on file       Results Data:

## 2024-10-23 ENCOUNTER — APPOINTMENT (OUTPATIENT)
Dept: RADIOLOGY | Facility: HOSPITAL | Age: 74
End: 2024-10-23
Payer: MEDICARE

## 2024-10-23 ENCOUNTER — HOSPITAL ENCOUNTER (OUTPATIENT)
Facility: HOSPITAL | Age: 74
Setting detail: OBSERVATION
Discharge: NURSING, REHAB OR LONG-TERM CARE | End: 2024-10-26
Attending: STUDENT IN AN ORGANIZED HEALTH CARE EDUCATION/TRAINING PROGRAM
Payer: MEDICARE

## 2024-10-23 ENCOUNTER — APPOINTMENT (OUTPATIENT)
Dept: CARDIOLOGY | Facility: HOSPITAL | Age: 74
End: 2024-10-23
Payer: MEDICARE

## 2024-10-23 DIAGNOSIS — I72.9 ANEURYSM (CMS-HCC): ICD-10-CM

## 2024-10-23 DIAGNOSIS — R42 DIZZINESS: Primary | ICD-10-CM

## 2024-10-23 DIAGNOSIS — R53.1 WEAKNESS: ICD-10-CM

## 2024-10-23 LAB
ALBUMIN SERPL BCP-MCNC: 3.4 G/DL (ref 3.4–5)
ALP SERPL-CCNC: 57 U/L (ref 33–136)
ALT SERPL W P-5'-P-CCNC: 8 U/L (ref 10–52)
ANION GAP SERPL CALC-SCNC: 12 MMOL/L (ref 10–20)
APPEARANCE UR: CLEAR
AST SERPL W P-5'-P-CCNC: 26 U/L (ref 9–39)
BASOPHILS # BLD AUTO: 0.03 X10*3/UL (ref 0–0.1)
BASOPHILS NFR BLD AUTO: 0.6 %
BILIRUB SERPL-MCNC: 0.5 MG/DL (ref 0–1.2)
BILIRUB UR STRIP.AUTO-MCNC: NEGATIVE MG/DL
BNP SERPL-MCNC: 56 PG/ML (ref 0–99)
BUN SERPL-MCNC: 16 MG/DL (ref 6–23)
CALCIUM SERPL-MCNC: 7.8 MG/DL (ref 8.6–10.3)
CARDIAC TROPONIN I PNL SERPL HS: 10 NG/L (ref 0–20)
CARDIAC TROPONIN I PNL SERPL HS: 9 NG/L (ref 0–20)
CHLORIDE SERPL-SCNC: 110 MMOL/L (ref 98–107)
CO2 SERPL-SCNC: 21 MMOL/L (ref 21–32)
COLOR UR: NORMAL
CREAT SERPL-MCNC: 1.22 MG/DL (ref 0.5–1.3)
EGFRCR SERPLBLD CKD-EPI 2021: 63 ML/MIN/1.73M*2
EOSINOPHIL # BLD AUTO: 0.15 X10*3/UL (ref 0–0.4)
EOSINOPHIL NFR BLD AUTO: 2.8 %
ERYTHROCYTE [DISTWIDTH] IN BLOOD BY AUTOMATED COUNT: 14.6 % (ref 11.5–14.5)
ETHYL GLUCURONIDE UR QL SCN: NEGATIVE NG/ML
GLUCOSE BLD MANUAL STRIP-MCNC: 80 MG/DL (ref 74–99)
GLUCOSE SERPL-MCNC: 123 MG/DL (ref 74–99)
GLUCOSE UR STRIP.AUTO-MCNC: NORMAL MG/DL
HCT VFR BLD AUTO: 37.7 % (ref 41–52)
HGB BLD-MCNC: 12.8 G/DL (ref 13.5–17.5)
IMM GRANULOCYTES # BLD AUTO: 0.01 X10*3/UL (ref 0–0.5)
IMM GRANULOCYTES NFR BLD AUTO: 0.2 % (ref 0–0.9)
KETONES UR STRIP.AUTO-MCNC: NEGATIVE MG/DL
LEUKOCYTE ESTERASE UR QL STRIP.AUTO: NEGATIVE
LIPASE SERPL-CCNC: 89 U/L (ref 9–82)
LYMPHOCYTES # BLD AUTO: 1.48 X10*3/UL (ref 0.8–3)
LYMPHOCYTES NFR BLD AUTO: 27.3 %
MAGNESIUM SERPL-MCNC: 1.8 MG/DL (ref 1.6–2.4)
MCH RBC QN AUTO: 28.8 PG (ref 26–34)
MCHC RBC AUTO-ENTMCNC: 34 G/DL (ref 32–36)
MCV RBC AUTO: 85 FL (ref 80–100)
MONOCYTES # BLD AUTO: 0.34 X10*3/UL (ref 0.05–0.8)
MONOCYTES NFR BLD AUTO: 6.3 %
NEUTROPHILS # BLD AUTO: 3.41 X10*3/UL (ref 1.6–5.5)
NEUTROPHILS NFR BLD AUTO: 62.8 %
NITRITE UR QL STRIP.AUTO: NEGATIVE
NRBC BLD-RTO: 0 /100 WBCS (ref 0–0)
PH UR STRIP.AUTO: 6 [PH]
PLATELET # BLD AUTO: 253 X10*3/UL (ref 150–450)
POTASSIUM SERPL-SCNC: 4 MMOL/L (ref 3.5–5.3)
PROT SERPL-MCNC: 5.7 G/DL (ref 6.4–8.2)
PROT UR STRIP.AUTO-MCNC: NEGATIVE MG/DL
RBC # BLD AUTO: 4.44 X10*6/UL (ref 4.5–5.9)
RBC # UR STRIP.AUTO: NEGATIVE /UL
SODIUM SERPL-SCNC: 139 MMOL/L (ref 136–145)
SP GR UR STRIP.AUTO: 1.01
TSH SERPL-ACNC: 1.12 MIU/L (ref 0.44–3.98)
UROBILINOGEN UR STRIP.AUTO-MCNC: NORMAL MG/DL
WBC # BLD AUTO: 5.4 X10*3/UL (ref 4.4–11.3)

## 2024-10-23 PROCEDURE — 84484 ASSAY OF TROPONIN QUANT: CPT | Performed by: STUDENT IN AN ORGANIZED HEALTH CARE EDUCATION/TRAINING PROGRAM

## 2024-10-23 PROCEDURE — 2500000004 HC RX 250 GENERAL PHARMACY W/ HCPCS (ALT 636 FOR OP/ED)

## 2024-10-23 PROCEDURE — 82947 ASSAY GLUCOSE BLOOD QUANT: CPT

## 2024-10-23 PROCEDURE — 2500000004 HC RX 250 GENERAL PHARMACY W/ HCPCS (ALT 636 FOR OP/ED): Mod: JZ | Performed by: STUDENT IN AN ORGANIZED HEALTH CARE EDUCATION/TRAINING PROGRAM

## 2024-10-23 PROCEDURE — 70498 CT ANGIOGRAPHY NECK: CPT | Performed by: RADIOLOGY

## 2024-10-23 PROCEDURE — 70553 MRI BRAIN STEM W/O & W/DYE: CPT | Performed by: STUDENT IN AN ORGANIZED HEALTH CARE EDUCATION/TRAINING PROGRAM

## 2024-10-23 PROCEDURE — 36415 COLL VENOUS BLD VENIPUNCTURE: CPT | Performed by: STUDENT IN AN ORGANIZED HEALTH CARE EDUCATION/TRAINING PROGRAM

## 2024-10-23 PROCEDURE — 70496 CT ANGIOGRAPHY HEAD: CPT | Performed by: RADIOLOGY

## 2024-10-23 PROCEDURE — 2500000002 HC RX 250 W HCPCS SELF ADMINISTERED DRUGS (ALT 637 FOR MEDICARE OP, ALT 636 FOR OP/ED)

## 2024-10-23 PROCEDURE — A9575 INJ GADOTERATE MEGLUMI 0.1ML: HCPCS | Performed by: STUDENT IN AN ORGANIZED HEALTH CARE EDUCATION/TRAINING PROGRAM

## 2024-10-23 PROCEDURE — 70496 CT ANGIOGRAPHY HEAD: CPT

## 2024-10-23 PROCEDURE — 93005 ELECTROCARDIOGRAM TRACING: CPT

## 2024-10-23 PROCEDURE — G0378 HOSPITAL OBSERVATION PER HR: HCPCS

## 2024-10-23 PROCEDURE — 96372 THER/PROPH/DIAG INJ SC/IM: CPT

## 2024-10-23 PROCEDURE — 83690 ASSAY OF LIPASE: CPT | Performed by: STUDENT IN AN ORGANIZED HEALTH CARE EDUCATION/TRAINING PROGRAM

## 2024-10-23 PROCEDURE — 85025 COMPLETE CBC W/AUTO DIFF WBC: CPT | Performed by: STUDENT IN AN ORGANIZED HEALTH CARE EDUCATION/TRAINING PROGRAM

## 2024-10-23 PROCEDURE — 2500000001 HC RX 250 WO HCPCS SELF ADMINISTERED DRUGS (ALT 637 FOR MEDICARE OP)

## 2024-10-23 PROCEDURE — 80307 DRUG TEST PRSMV CHEM ANLYZR: CPT

## 2024-10-23 PROCEDURE — 2500000005 HC RX 250 GENERAL PHARMACY W/O HCPCS

## 2024-10-23 PROCEDURE — 83735 ASSAY OF MAGNESIUM: CPT | Performed by: STUDENT IN AN ORGANIZED HEALTH CARE EDUCATION/TRAINING PROGRAM

## 2024-10-23 PROCEDURE — 2500000002 HC RX 250 W HCPCS SELF ADMINISTERED DRUGS (ALT 637 FOR MEDICARE OP, ALT 636 FOR OP/ED): Performed by: STUDENT IN AN ORGANIZED HEALTH CARE EDUCATION/TRAINING PROGRAM

## 2024-10-23 PROCEDURE — 71045 X-RAY EXAM CHEST 1 VIEW: CPT | Performed by: RADIOLOGY

## 2024-10-23 PROCEDURE — 83880 ASSAY OF NATRIURETIC PEPTIDE: CPT | Performed by: STUDENT IN AN ORGANIZED HEALTH CARE EDUCATION/TRAINING PROGRAM

## 2024-10-23 PROCEDURE — 84443 ASSAY THYROID STIM HORMONE: CPT | Performed by: STUDENT IN AN ORGANIZED HEALTH CARE EDUCATION/TRAINING PROGRAM

## 2024-10-23 PROCEDURE — 2550000001 HC RX 255 CONTRASTS: Performed by: STUDENT IN AN ORGANIZED HEALTH CARE EDUCATION/TRAINING PROGRAM

## 2024-10-23 PROCEDURE — 81003 URINALYSIS AUTO W/O SCOPE: CPT | Performed by: STUDENT IN AN ORGANIZED HEALTH CARE EDUCATION/TRAINING PROGRAM

## 2024-10-23 PROCEDURE — 71045 X-RAY EXAM CHEST 1 VIEW: CPT

## 2024-10-23 PROCEDURE — 96365 THER/PROPH/DIAG IV INF INIT: CPT | Mod: 59

## 2024-10-23 PROCEDURE — 80053 COMPREHEN METABOLIC PANEL: CPT | Performed by: STUDENT IN AN ORGANIZED HEALTH CARE EDUCATION/TRAINING PROGRAM

## 2024-10-23 PROCEDURE — 70553 MRI BRAIN STEM W/O & W/DYE: CPT

## 2024-10-23 PROCEDURE — 96366 THER/PROPH/DIAG IV INF ADDON: CPT

## 2024-10-23 PROCEDURE — 99285 EMERGENCY DEPT VISIT HI MDM: CPT | Mod: 25

## 2024-10-23 RX ORDER — POLYETHYLENE GLYCOL 3350 17 G/17G
17 POWDER, FOR SOLUTION ORAL DAILY
Status: DISCONTINUED | OUTPATIENT
Start: 2024-10-23 | End: 2024-10-26 | Stop reason: HOSPADM

## 2024-10-23 RX ORDER — EZETIMIBE 10 MG/1
10 TABLET ORAL DAILY
Status: DISCONTINUED | OUTPATIENT
Start: 2024-10-23 | End: 2024-10-26 | Stop reason: HOSPADM

## 2024-10-23 RX ORDER — CALCIUM GLUCONATE 20 MG/ML
2 INJECTION, SOLUTION INTRAVENOUS ONCE
Status: COMPLETED | OUTPATIENT
Start: 2024-10-23 | End: 2024-10-23

## 2024-10-23 RX ORDER — ONDANSETRON HYDROCHLORIDE 2 MG/ML
4 INJECTION, SOLUTION INTRAVENOUS EVERY 8 HOURS PRN
Status: DISCONTINUED | OUTPATIENT
Start: 2024-10-23 | End: 2024-10-26 | Stop reason: HOSPADM

## 2024-10-23 RX ORDER — CLOPIDOGREL BISULFATE 75 MG/1
75 TABLET ORAL DAILY
Status: DISCONTINUED | OUTPATIENT
Start: 2024-10-23 | End: 2024-10-26 | Stop reason: HOSPADM

## 2024-10-23 RX ORDER — ALBUTEROL SULFATE 0.83 MG/ML
2.5 SOLUTION RESPIRATORY (INHALATION) EVERY 6 HOURS PRN
Status: DISCONTINUED | OUTPATIENT
Start: 2024-10-23 | End: 2024-10-24

## 2024-10-23 RX ORDER — ACETAMINOPHEN 325 MG/1
650 TABLET ORAL EVERY 4 HOURS PRN
Status: DISCONTINUED | OUTPATIENT
Start: 2024-10-23 | End: 2024-10-26 | Stop reason: HOSPADM

## 2024-10-23 RX ORDER — ACETAMINOPHEN 160 MG/5ML
650 SOLUTION ORAL EVERY 4 HOURS PRN
Status: DISCONTINUED | OUTPATIENT
Start: 2024-10-23 | End: 2024-10-26 | Stop reason: HOSPADM

## 2024-10-23 RX ORDER — ENOXAPARIN SODIUM 100 MG/ML
40 INJECTION SUBCUTANEOUS EVERY 24 HOURS
Status: DISCONTINUED | OUTPATIENT
Start: 2024-10-23 | End: 2024-10-26 | Stop reason: HOSPADM

## 2024-10-23 RX ORDER — GLIMEPIRIDE 2 MG/1
1 TABLET ORAL DAILY
Status: DISCONTINUED | OUTPATIENT
Start: 2024-10-23 | End: 2024-10-26 | Stop reason: HOSPADM

## 2024-10-23 RX ORDER — TALC
3 POWDER (GRAM) TOPICAL NIGHTLY PRN
Status: DISCONTINUED | OUTPATIENT
Start: 2024-10-23 | End: 2024-10-26 | Stop reason: HOSPADM

## 2024-10-23 RX ORDER — ATORVASTATIN CALCIUM 80 MG/1
80 TABLET, FILM COATED ORAL NIGHTLY
Status: DISCONTINUED | OUTPATIENT
Start: 2024-10-23 | End: 2024-10-26 | Stop reason: HOSPADM

## 2024-10-23 RX ORDER — GADOTERATE MEGLUMINE 376.9 MG/ML
20 INJECTION INTRAVENOUS
Status: COMPLETED | OUTPATIENT
Start: 2024-10-23 | End: 2024-10-23

## 2024-10-23 RX ORDER — ONDANSETRON 4 MG/1
4 TABLET, FILM COATED ORAL EVERY 8 HOURS PRN
Status: DISCONTINUED | OUTPATIENT
Start: 2024-10-23 | End: 2024-10-26 | Stop reason: HOSPADM

## 2024-10-23 RX ORDER — ACETAMINOPHEN 650 MG/1
650 SUPPOSITORY RECTAL EVERY 4 HOURS PRN
Status: DISCONTINUED | OUTPATIENT
Start: 2024-10-23 | End: 2024-10-26 | Stop reason: HOSPADM

## 2024-10-23 RX ORDER — MECLIZINE HYDROCHLORIDE 25 MG/1
25 TABLET ORAL 3 TIMES DAILY PRN
Status: DISCONTINUED | OUTPATIENT
Start: 2024-10-23 | End: 2024-10-26 | Stop reason: HOSPADM

## 2024-10-23 RX ORDER — METOPROLOL SUCCINATE 25 MG/1
25 TABLET, EXTENDED RELEASE ORAL DAILY
Status: DISCONTINUED | OUTPATIENT
Start: 2024-10-23 | End: 2024-10-26 | Stop reason: HOSPADM

## 2024-10-23 RX ORDER — MECLIZINE HYDROCHLORIDE 25 MG/1
50 TABLET ORAL ONCE
Status: COMPLETED | OUTPATIENT
Start: 2024-10-23 | End: 2024-10-23

## 2024-10-23 SDOH — SOCIAL STABILITY: SOCIAL INSECURITY: ARE YOU OR HAVE YOU BEEN THREATENED OR ABUSED PHYSICALLY, EMOTIONALLY, OR SEXUALLY BY ANYONE?: NO

## 2024-10-23 SDOH — SOCIAL STABILITY: SOCIAL INSECURITY
WITHIN THE LAST YEAR, HAVE YOU BEEN KICKED, HIT, SLAPPED, OR OTHERWISE PHYSICALLY HURT BY YOUR PARTNER OR EX-PARTNER?: NO

## 2024-10-23 SDOH — ECONOMIC STABILITY: FOOD INSECURITY: WITHIN THE PAST 12 MONTHS, THE FOOD YOU BOUGHT JUST DIDN'T LAST AND YOU DIDN'T HAVE MONEY TO GET MORE.: NEVER TRUE

## 2024-10-23 SDOH — SOCIAL STABILITY: SOCIAL INSECURITY: HAVE YOU HAD THOUGHTS OF HARMING ANYONE ELSE?: NO

## 2024-10-23 SDOH — SOCIAL STABILITY: SOCIAL INSECURITY: DOES ANYONE TRY TO KEEP YOU FROM HAVING/CONTACTING OTHER FRIENDS OR DOING THINGS OUTSIDE YOUR HOME?: NO

## 2024-10-23 SDOH — SOCIAL STABILITY: SOCIAL INSECURITY
WITHIN THE LAST YEAR, HAVE YOU BEEN RAPED OR FORCED TO HAVE ANY KIND OF SEXUAL ACTIVITY BY YOUR PARTNER OR EX-PARTNER?: NO

## 2024-10-23 SDOH — SOCIAL STABILITY: SOCIAL INSECURITY: DO YOU FEEL UNSAFE GOING BACK TO THE PLACE WHERE YOU ARE LIVING?: NO

## 2024-10-23 SDOH — ECONOMIC STABILITY: FOOD INSECURITY: WITHIN THE PAST 12 MONTHS, YOU WORRIED THAT YOUR FOOD WOULD RUN OUT BEFORE YOU GOT THE MONEY TO BUY MORE.: NEVER TRUE

## 2024-10-23 SDOH — SOCIAL STABILITY: SOCIAL INSECURITY: WITHIN THE LAST YEAR, HAVE YOU BEEN HUMILIATED OR EMOTIONALLY ABUSED IN OTHER WAYS BY YOUR PARTNER OR EX-PARTNER?: NO

## 2024-10-23 SDOH — ECONOMIC STABILITY: INCOME INSECURITY: IN THE PAST 12 MONTHS HAS THE ELECTRIC, GAS, OIL, OR WATER COMPANY THREATENED TO SHUT OFF SERVICES IN YOUR HOME?: NO

## 2024-10-23 SDOH — SOCIAL STABILITY: SOCIAL INSECURITY: ABUSE: ADULT

## 2024-10-23 SDOH — SOCIAL STABILITY: SOCIAL INSECURITY: WITHIN THE LAST YEAR, HAVE YOU BEEN AFRAID OF YOUR PARTNER OR EX-PARTNER?: NO

## 2024-10-23 SDOH — SOCIAL STABILITY: SOCIAL INSECURITY: HAS ANYONE EVER THREATENED TO HURT YOUR FAMILY OR YOUR PETS?: NO

## 2024-10-23 SDOH — SOCIAL STABILITY: SOCIAL INSECURITY: DO YOU FEEL ANYONE HAS EXPLOITED OR TAKEN ADVANTAGE OF YOU FINANCIALLY OR OF YOUR PERSONAL PROPERTY?: NO

## 2024-10-23 SDOH — SOCIAL STABILITY: SOCIAL INSECURITY: ARE THERE ANY APPARENT SIGNS OF INJURIES/BEHAVIORS THAT COULD BE RELATED TO ABUSE/NEGLECT?: NO

## 2024-10-23 ASSESSMENT — COGNITIVE AND FUNCTIONAL STATUS - GENERAL
CLIMB 3 TO 5 STEPS WITH RAILING: A LITTLE
WALKING IN HOSPITAL ROOM: A LITTLE
MOBILITY SCORE: 22
DAILY ACTIVITIY SCORE: 24
PATIENT BASELINE BEDBOUND: NO

## 2024-10-23 ASSESSMENT — ACTIVITIES OF DAILY LIVING (ADL)
HEARING - RIGHT EAR: FUNCTIONAL
LACK_OF_TRANSPORTATION: NO
PATIENT'S MEMORY ADEQUATE TO SAFELY COMPLETE DAILY ACTIVITIES?: YES
BATHING: INDEPENDENT
WALKS IN HOME: INDEPENDENT
DRESSING YOURSELF: INDEPENDENT
JUDGMENT_ADEQUATE_SAFELY_COMPLETE_DAILY_ACTIVITIES: YES
GROOMING: INDEPENDENT
HEARING - LEFT EAR: FUNCTIONAL
TOILETING: INDEPENDENT
ADEQUATE_TO_COMPLETE_ADL: YES
FEEDING YOURSELF: INDEPENDENT

## 2024-10-23 ASSESSMENT — PAIN - FUNCTIONAL ASSESSMENT
PAIN_FUNCTIONAL_ASSESSMENT: 0-10
PAIN_FUNCTIONAL_ASSESSMENT: 0-10

## 2024-10-23 ASSESSMENT — COLUMBIA-SUICIDE SEVERITY RATING SCALE - C-SSRS
1. IN THE PAST MONTH, HAVE YOU WISHED YOU WERE DEAD OR WISHED YOU COULD GO TO SLEEP AND NOT WAKE UP?: NO
2. HAVE YOU ACTUALLY HAD ANY THOUGHTS OF KILLING YOURSELF?: NO
6. HAVE YOU EVER DONE ANYTHING, STARTED TO DO ANYTHING, OR PREPARED TO DO ANYTHING TO END YOUR LIFE?: NO

## 2024-10-23 ASSESSMENT — LIFESTYLE VARIABLES
HOW OFTEN DO YOU HAVE A DRINK CONTAINING ALCOHOL: NEVER
SKIP TO QUESTIONS 9-10: 1
AUDIT-C TOTAL SCORE: 0
AUDIT-C TOTAL SCORE: 0
HOW OFTEN DO YOU HAVE 6 OR MORE DRINKS ON ONE OCCASION: NEVER
HOW MANY STANDARD DRINKS CONTAINING ALCOHOL DO YOU HAVE ON A TYPICAL DAY: PATIENT DOES NOT DRINK

## 2024-10-23 ASSESSMENT — PAIN SCALES - GENERAL
PAINLEVEL_OUTOF10: 0 - NO PAIN

## 2024-10-23 ASSESSMENT — PAIN DESCRIPTION - PROGRESSION: CLINICAL_PROGRESSION: NOT CHANGED

## 2024-10-23 ASSESSMENT — PATIENT HEALTH QUESTIONNAIRE - PHQ9
SUM OF ALL RESPONSES TO PHQ9 QUESTIONS 1 & 2: 2
1. LITTLE INTEREST OR PLEASURE IN DOING THINGS: SEVERAL DAYS
2. FEELING DOWN, DEPRESSED OR HOPELESS: SEVERAL DAYS

## 2024-10-23 NOTE — ED PROVIDER NOTES
HPI   Chief Complaint   Patient presents with    Dizziness       73-year-old male with pertinent past medical history of type 2 diabetes, CKD stage III, atrial tachycardia, atrial fibrillation, SVT,COPD, PVD who presents with chief concern of weakness.  Patient states that when he woke up this morning he felt significantly weak and was having a hard time getting around.  Denies any chest pain, shortness of breath, recent nausea vomiting or diarrhea.  Denies any abdominal pain, dysuria, hematuria, leg swelling.  Just notes that he feels weak.              Patient History   Past Medical History:   Diagnosis Date    Abnormal weight loss 2015    Weight loss    Acute upper respiratory infection, unspecified 2014    Acute upper respiratory infection    Encounter for immunization 2020    Need for prophylactic vaccination and inoculation against influenza    Encounter for screening for malignant neoplasm of prostate 2015    Screening for prostate cancer    Encounter for screening for malignant neoplasm of prostate 2013    Encounter for screening for malignant neoplasm of prostate    Personal history of other diseases of urinary system 2013    History of acute renal failure    Personal history of other endocrine, nutritional and metabolic disease     History of high cholesterol     Past Surgical History:   Procedure Laterality Date    COLONOSCOPY  2013    Complete Colonoscopy    OTHER SURGICAL HISTORY  2019    Aortobifemoral bypass     No family history on file.  Social History     Tobacco Use    Smoking status: Former     Current packs/day: 0.00     Average packs/day: 1 pack/day for 25.0 years (25.0 ttl pk-yrs)     Types: Cigarettes     Start date:      Quit date:      Years since quittin.8     Passive exposure: Past    Smokeless tobacco: Never   Substance Use Topics    Alcohol use: Not Currently     Comment: stopped in     Drug use: Not Currently     Comment:  quit long time ago       Physical Exam   ED Triage Vitals [10/23/24 1120]   Temperature Heart Rate Respirations BP   36.3 °C (97.4 °F) 54 18 153/78      Pulse Ox Temp Source Heart Rate Source Patient Position   97 % Temporal Monitor Sitting      BP Location FiO2 (%)     Left arm --       Physical Exam  Vitals and nursing note reviewed.   Constitutional:       Appearance: He is not ill-appearing.   HENT:      Head: Atraumatic.      Nose: Nose normal.      Mouth/Throat:      Mouth: Mucous membranes are moist.      Pharynx: Oropharynx is clear.   Eyes:      Pupils: Pupils are equal, round, and reactive to light.   Cardiovascular:      Rate and Rhythm: Bradycardia present. Rhythm irregular.      Pulses: Normal pulses.   Pulmonary:      Effort: Pulmonary effort is normal.   Abdominal:      General: Abdomen is flat.   Musculoskeletal:      Cervical back: Neck supple.      Right lower leg: No edema.      Left lower leg: No edema.   Skin:     General: Skin is warm and dry.      Capillary Refill: Capillary refill takes 2 to 3 seconds.   Neurological:      General: No focal deficit present.      Mental Status: He is alert and oriented to person, place, and time.      Sensory: No sensory deficit.      Motor: Weakness (Global/diffuse) present.           ED Course & MDM   ED Course as of 10/23/24 1921   Wed Oct 23, 2024   1143 EKG: Individual interpretation  Sinus rhythm with PACs, suspect without PACs would actually be sinus bradycardia, rate of 64 bpm, , QRS 88, QTc 406  No ST segment elevation or depression, left axis deviation, no abnormal T wave versions. [KK]   1215 Patient is a 73-year-old male with pertinent past medical history of type 2 diabetes, CKD stage III, atrial tachycardia, atrial fibrillation, SVT,COPD, PVD who presents with chief concern of weakness.  Patient states that when he woke up this morning he felt significantly weak and was having a hard time getting around.  Denies any chest pain, shortness of  "breath, recent nausea vomiting or diarrhea.  Denies any abdominal pain, dysuria, hematuria, leg swelling.  Just notes that he feels weak.    On exam, patient has an abnormal bradycardic rhythm with P waves.  Heart lungs clear to auscultation, neurologically intact, abdomen soft nontender, no lower extremity pitting edema.  Has a hard time sitting upright in bed secondary to weakness    In the setting of his kidney disease, heart risk factors and abnormal heart rhythm concern for electrolyte derangement, underlying ACS versus ischemia.  Will complete broad workup, provide calcium gluconate. [KK]   1250 XR chest 1 view  IMPRESSION:  Allowing for the aforementioned limitation, no acute cardiopulmonary  disease.       [KK]   1325 POTASSIUM: 4.0 [KK]   1500 Repeat EKG: Individual interpretation  Normal sinus, 62 bpm, , QRS 94, QTc 408  Normal axis, no ST segment elevation or depression.  No conduction blocks or delays, no abnormal T wave inversions. [KK]   1501 Overall patient's workup is unremarkable, including repeat EKG.  Did have a low calcium, which was replaced, aside from symptoms, no acute process. [KK]   1542 On reevaluation after not initially telling me patient felt dizzy, he does note that the room feels like it spinning around him.  Denies any specific lightheadedness, not having any chest pain shortness of breath but feels \"weak\".  Will provide patient with meclizine, trial ambulation, and assess urinalysis. [KK]   1648 Patient unable to ambulate due to feeling off balance, RN noted he was significantly unsteady and had to cancel ambulatory trial secondary to this.  Will complete CT CTA, admit for possible stroke in the posterior circulation. [KK]   1808 CT angio head and neck w and wo IV contrast  IMPRESSION:  1. No intracranial major vascular occlusion.  2. No flow-limiting stenosis or significant plaque irregularity in  the neck.  3. Saccular aneurysm projecting superiorly from the M1 segment " right  middle cerebral artery measures 2.6 x 2.3 x 2.9 mm and is directed  superiorly.  4. Approximately 50% atherosclerotic short-segment stenosis proximal  left internal carotid artery.  5. Supratentorial white matter hypodensities most likely related to  chronic small vessel ischemic change.       [KK]      ED Course User Index  [KK] Elmer Diaz DO         Diagnoses as of 10/23/24 1921   Dizziness   Weakness   Aneurysm (CMS-[facility])                 No data recorded     Tessa Coma Scale Score: 15 (10/23/24 1159 : Maria Ines Grace RN)                           Medical Decision Making  Patient presented with generalized weakness.  Initial workup and evaluation focused on his complaints of generalized weakness over the last several days.  No evidence of infection, but did have an abnormal bradycardic rhythm with some PACs.  Was given calcium which did not change his rhythm.  Initial workup unremarkable with no evidence of infection, anemia or electrolyte disturbances.  When I went to discuss patient's results he did elicit an entirely history about dizziness and after ambulation did have some ataxia concerning for a central process.  CT and CTA of the head and neck completed showing an aneurysm, no prior contrasted studies but no obvious bleeding and no headaches to make me believe that the aneurysm is the likely cause of his findings.  Will need MRI with and without contrast and neurology consultation.  Admitted patient to observation.        Procedure  Procedures     Elmer Diaz DO  10/23/24 1921

## 2024-10-23 NOTE — ED NOTES
Report attempted. Unable to reach receiving nurse.     Maria Ines Grace, GUTIERREZ  10/23/24 1923

## 2024-10-23 NOTE — ED TRIAGE NOTES
Patient presents to ED from home for syncope and dizziness that started a couple of days ago. VSS, no distress.

## 2024-10-24 LAB
1OH-MIDAZOLAM UR CFM-MCNC: <25 NG/ML
6MAM UR CFM-MCNC: <25 NG/ML
7AMINOCLONAZEPAM UR CFM-MCNC: <25 NG/ML
A-OH ALPRAZ UR CFM-MCNC: <25 NG/ML
ALPRAZ UR CFM-MCNC: <25 NG/ML
AMPHETAMINES UR QL SCN: NORMAL
ANION GAP SERPL CALC-SCNC: 11 MMOL/L (ref 10–20)
BARBITURATES UR QL SCN: NORMAL
BENZODIAZ UR QL SCN: NORMAL
BUN SERPL-MCNC: 18 MG/DL (ref 6–23)
BZE UR QL SCN: NORMAL
CALCIUM SERPL-MCNC: 8.1 MG/DL (ref 8.6–10.3)
CANNABINOIDS UR QL SCN: NORMAL
CHLORDIAZEP UR CFM-MCNC: <25 NG/ML
CHLORIDE SERPL-SCNC: 106 MMOL/L (ref 98–107)
CLONAZEPAM UR CFM-MCNC: <25 NG/ML
CO2 SERPL-SCNC: 25 MMOL/L (ref 21–32)
CODEINE UR CFM-MCNC: <50 NG/ML
CREAT SERPL-MCNC: 1.26 MG/DL (ref 0.5–1.3)
DIAZEPAM UR CFM-MCNC: <25 NG/ML
EGFRCR SERPLBLD CKD-EPI 2021: 60 ML/MIN/1.73M*2
ERYTHROCYTE [DISTWIDTH] IN BLOOD BY AUTOMATED COUNT: 14.6 % (ref 11.5–14.5)
FENTANYL+NORFENTANYL UR QL SCN: NORMAL
GLUCOSE SERPL-MCNC: 115 MG/DL (ref 74–99)
HCT VFR BLD AUTO: 36.1 % (ref 41–52)
HGB BLD-MCNC: 12.2 G/DL (ref 13.5–17.5)
HYDROCODONE CTO UR CFM-MCNC: <25 NG/ML
HYDROMORPHONE UR CFM-MCNC: <25 NG/ML
LORAZEPAM UR CFM-MCNC: <25 NG/ML
MCH RBC QN AUTO: 29.4 PG (ref 26–34)
MCHC RBC AUTO-ENTMCNC: 33.8 G/DL (ref 32–36)
MCV RBC AUTO: 87 FL (ref 80–100)
METHADONE UR QL SCN: NORMAL
MIDAZOLAM UR CFM-MCNC: <25 NG/ML
MORPHINE UR CFM-MCNC: <50 NG/ML
NORDIAZEPAM UR CFM-MCNC: <25 NG/ML
NORHYDROCODONE UR CFM-MCNC: <25 NG/ML
NOROXYCODONE UR CFM-MCNC: <25 NG/ML
NRBC BLD-RTO: 0 /100 WBCS (ref 0–0)
OPIATES UR QL SCN: NORMAL
OXAZEPAM UR CFM-MCNC: <25 NG/ML
OXYCODONE UR CFM-MCNC: <25 NG/ML
OXYCODONE+OXYMORPHONE UR QL SCN: NORMAL
OXYMORPHONE UR CFM-MCNC: <25 NG/ML
PCP UR QL SCN: NORMAL
PLATELET # BLD AUTO: 238 X10*3/UL (ref 150–450)
POTASSIUM SERPL-SCNC: 4 MMOL/L (ref 3.5–5.3)
RBC # BLD AUTO: 4.15 X10*6/UL (ref 4.5–5.9)
SODIUM SERPL-SCNC: 138 MMOL/L (ref 136–145)
TEMAZEPAM UR CFM-MCNC: <25 NG/ML
WBC # BLD AUTO: 6.6 X10*3/UL (ref 4.4–11.3)

## 2024-10-24 PROCEDURE — 2500000002 HC RX 250 W HCPCS SELF ADMINISTERED DRUGS (ALT 637 FOR MEDICARE OP, ALT 636 FOR OP/ED): Mod: MUE

## 2024-10-24 PROCEDURE — 80048 BASIC METABOLIC PNL TOTAL CA: CPT

## 2024-10-24 PROCEDURE — 96372 THER/PROPH/DIAG INJ SC/IM: CPT

## 2024-10-24 PROCEDURE — 36415 COLL VENOUS BLD VENIPUNCTURE: CPT

## 2024-10-24 PROCEDURE — 97161 PT EVAL LOW COMPLEX 20 MIN: CPT | Mod: GP

## 2024-10-24 PROCEDURE — 97165 OT EVAL LOW COMPLEX 30 MIN: CPT | Mod: GO | Performed by: OCCUPATIONAL THERAPIST

## 2024-10-24 PROCEDURE — 85027 COMPLETE CBC AUTOMATED: CPT

## 2024-10-24 PROCEDURE — 2500000001 HC RX 250 WO HCPCS SELF ADMINISTERED DRUGS (ALT 637 FOR MEDICARE OP)

## 2024-10-24 PROCEDURE — 94640 AIRWAY INHALATION TREATMENT: CPT | Mod: 76,MUE

## 2024-10-24 PROCEDURE — 2500000002 HC RX 250 W HCPCS SELF ADMINISTERED DRUGS (ALT 637 FOR MEDICARE OP, ALT 636 FOR OP/ED): Mod: MUE | Performed by: INTERNAL MEDICINE

## 2024-10-24 PROCEDURE — G0378 HOSPITAL OBSERVATION PER HR: HCPCS

## 2024-10-24 PROCEDURE — 2500000004 HC RX 250 GENERAL PHARMACY W/ HCPCS (ALT 636 FOR OP/ED)

## 2024-10-24 RX ORDER — ALBUTEROL SULFATE 0.83 MG/ML
2.5 SOLUTION RESPIRATORY (INHALATION) EVERY 2 HOUR PRN
Status: DISCONTINUED | OUTPATIENT
Start: 2024-10-24 | End: 2024-10-26 | Stop reason: HOSPADM

## 2024-10-24 RX ORDER — ALBUTEROL SULFATE 0.83 MG/ML
2.5 SOLUTION RESPIRATORY (INHALATION)
Status: DISCONTINUED | OUTPATIENT
Start: 2024-10-24 | End: 2024-10-26 | Stop reason: HOSPADM

## 2024-10-24 RX ORDER — ALBUTEROL SULFATE 0.83 MG/ML
2.5 SOLUTION RESPIRATORY (INHALATION) EVERY 6 HOURS PRN
Status: DISCONTINUED | OUTPATIENT
Start: 2024-10-24 | End: 2024-10-24

## 2024-10-24 ASSESSMENT — COGNITIVE AND FUNCTIONAL STATUS - GENERAL
STANDING UP FROM CHAIR USING ARMS: A LITTLE
WALKING IN HOSPITAL ROOM: A LITTLE
DAILY ACTIVITIY SCORE: 24
MOBILITY SCORE: 22
CLIMB 3 TO 5 STEPS WITH RAILING: TOTAL
CLIMB 3 TO 5 STEPS WITH RAILING: A LITTLE
PERSONAL GROOMING: A LITTLE
MOBILITY SCORE: 16
HELP NEEDED FOR BATHING: A LOT
DRESSING REGULAR UPPER BODY CLOTHING: A LITTLE
MOVING TO AND FROM BED TO CHAIR: A LITTLE
WALKING IN HOSPITAL ROOM: A LITTLE
MOVING FROM LYING ON BACK TO SITTING ON SIDE OF FLAT BED WITH BEDRAILS: A LITTLE
TURNING FROM BACK TO SIDE WHILE IN FLAT BAD: A LITTLE
DAILY ACTIVITIY SCORE: 16
TOILETING: A LOT
DRESSING REGULAR LOWER BODY CLOTHING: A LOT

## 2024-10-24 ASSESSMENT — PAIN SCALES - GENERAL
PAINLEVEL_OUTOF10: 0 - NO PAIN

## 2024-10-24 ASSESSMENT — ENCOUNTER SYMPTOMS
CHEST TIGHTNESS: 0
DIAPHORESIS: 0
FATIGUE: 0
CONSTIPATION: 0
CHILLS: 0
NAUSEA: 0
VOMITING: 0
DYSURIA: 0
PALPITATIONS: 0
DIARRHEA: 0
FEVER: 0
LIGHT-HEADEDNESS: 0
DIFFICULTY URINATING: 0
WEAKNESS: 1
SORE THROAT: 0
ABDOMINAL PAIN: 0
SHORTNESS OF BREATH: 0
HEADACHES: 0
DIZZINESS: 1
RHINORRHEA: 0
COUGH: 0

## 2024-10-24 ASSESSMENT — PAIN - FUNCTIONAL ASSESSMENT
PAIN_FUNCTIONAL_ASSESSMENT: 0-10

## 2024-10-24 ASSESSMENT — ACTIVITIES OF DAILY LIVING (ADL)
ADL_ASSISTANCE: INDEPENDENT
ADL_ASSISTANCE: INDEPENDENT
LACK_OF_TRANSPORTATION: NO

## 2024-10-24 NOTE — PROGRESS NOTES
Physical Therapy    Physical Therapy Evaluation    Patient Name: Brendan Rob  MRN: [id number]  Department: [place] [id number]  Room: [address] [place] [address]  Today's Date: 10/24/2024   Time Calculation  Start Time: 1548  Stop Time: 1558  Time Calculation (min): 10 min    Assessment/Plan   PT Assessment  PT Assessment Results: Decreased strength, Decreased endurance, Impaired balance, Decreased mobility, Decreased safety awareness  Rehab Prognosis: Good  Barriers to Discharge: Decreased activity tolerance and balance  Evaluation/Treatment Tolerance: Other (Comment) (Dizziness)  Medical Staff Made Aware: Yes  Strengths: Ability to acquire knowledge, Premorbid level of function, Support and attitude of living partners  Barriers to Participation: Comorbidities  End of Session Communication: Bedside nurse  Assessment Comment: Pt presents today with decreased BLE strength, balance, activity tolerance, and Hx of recent fall. Currently, pt is below the reported PLOF requiring min assist for transfers, standing balance, and ambulation. Continued PT would benefit the pt to address the above impairments to bring the pt closer to the PLOF while reducing fall risk.  End of Session Patient Position: Bed, 3 rail up, Alarm on  IP OR SWING BED PT PLAN  Inpatient or Swing Bed: Inpatient  PT Plan  Treatment/Interventions: Bed mobility, Transfer training, Gait training, Stair training, Balance training, Strengthening, Endurance training, Therapeutic exercise, Therapeutic activity, Home exercise program, Postural re-education  PT Plan: Ongoing PT  PT Frequency: 3 times per week  PT Discharge Recommendations: Moderate intensity level of continued care  Equipment Recommended upon Discharge: Wheeled walker  PT Recommended Transfer Status: Assist x1, Assistive device  PT - OK to Discharge: Yes (PT POC intiated today)    Subjective   General Visit Information:  General  Reason for Referral: 72 y/o M presenting with generalized weakness and dizziness. Pt discoverd  to have MCA aneurysm  Referred By: GARY Cornelius ([place])  Past Medical History Relevant to Rehab: DMT2, CKD 3, HTN, a-fib, AC, SVT, COPD, PVD  Family/Caregiver Present: No  Co-Treatment: OT  Co-Treatment Reason: Co-evaluation with OT to maximize pt safety, transfer ability, and participation.  Prior to Session Communication: Bedside nurse  Patient Position Received: Bed, 3 rail up, Alarm off, not on at start of session  Preferred Learning Style: auditory, kinesthetic, visual  General Comment: Pt supine in bed upon PT arrival. Cleared to participate with RN, and agreeble to co-evaluation with PT/OT.  Home Living:  Home Living  Type of Home: House  Lives With: Spouse  Home Adaptive Equipment: Walker rolling or standard  Home Layout: Two level, Able to live on main level with bedroom/bathroom  Home Access: Stairs to enter without rails  Entrance Stairs-Rails: None  Entrance Stairs-Number of Steps: 2  Bathroom Shower/Tub: Walk-in shower  Bathroom Toilet: Handicapped height  Bathroom Equipment: None  Prior Level of Function:  Prior Function Per Pt/Caregiver Report  Level of [place]: Independent with ADLs and functional transfers, Independent with homemaking with ambulation  Receives Help From: Family  ADL Assistance: Independent  Homemaking Assistance: Independent (Pt reports IND laundry and wife completes meal preparation)  Ambulatory Assistance: Independent (No AD)  Vocational: Retired (Pt is retired )  Hand Dominance: Right  Prior Function Comments: +Driving. Pt reports 1 recent fall 2/2 dizziness  Precautions:  Precautions  Medical Precautions: Fall precautions     Vital Signs (Past 2hrs)        Date/Time Vitals Session Patient Position Pulse Resp SpO2 BP MAP (mmHg)    10/24/24 1435 --  --  --  --  96 %  --  --     10/24/24 1548 --  Lying  --  --  --  140/115  --                   Vital Signs Comment: Pt reporting diziness throughout session that slightly improves with laying down.     Objective    Pain:  Pain Assessment  Pain Assessment: 0-10  0-10 (Numeric) Pain Score: 0 - No pain  Cognition:  Cognition  Orientation Level: Oriented X4  Insight: Mild  Impulsive: Mildly    General Assessments:  Activity Tolerance  Endurance: Decreased tolerance for upright activites  Activity Tolerance Comments: Increasing dizziness with upright activity    Sensation  Light Touch: No apparent deficits    Coordination  Coordination Comment: Appears intact    Postural Control  Postural Control: Impaired  Head Control: Forward head posture with gaze directed at [place] ground  Trunk Control: Trunk flexion in standing  Posture Comment: Cassandra goins    Static Sitting Balance  Static Sitting-Balance Support: Bilateral upper extremity supported, Feet supported  Static Sitting-Level of Assistance: Close supervision  Static Sitting-Comment/Number of Minutes: At EOB  Dynamic Sitting Balance  Dynamic Sitting-Balance Support: Bilateral upper extremity supported, Feet supported  Dynamic Sitting-Level of Assistance: Close supervision, Contact guard    Static Standing Balance  Static Standing-Balance Support: Bilateral upper extremity supported  Static Standing-Level of Assistance: Minimum assistance  Static Standing-Comment/Number of Minutes: With FWW support.  Dynamic Standing Balance  Dynamic Standing-Balance Support: Bilateral upper extremity supported  Dynamic Standing-Level of Assistance: Minimum assistance  Dynamic Standing-Balance: Turning  Dynamic Standing-Comments: With FWW support  Functional Assessments:  Bed Mobility  Bed Mobility: Yes  Bed Mobility 1  Bed Mobility 1: Supine to sitting  Level of Assistance 1: Close supervision  Bed Mobility Comments 1: HOB elevated. Pt able to progress BLE off the EOB and use UE push on the bed to bring trunk into upright sitting. Pt reports dizziness in sitting  Bed Mobility 2  Bed Mobility  2: Scooting  Level of Assistance 2: Contact guard  Bed Mobility Comments 2: Pt performs 2 scoots to  the right while seated EOB. Decreased trunk control requring CGA for steadying  Bed Mobility 3  Bed Mobility 3: Sitting to supine  Level of Assistance 3: Close supervision  Bed Mobility Comments 3: HOB elevated. Pt able to lift BLE into bed with fair trunk control on descent to the bed. Pt opted to stay in left sidelying position.    Transfers  Transfer: Yes  Transfer 1  Transfer From 1: Bed to  Transfer to 1: Stand  Technique 1: Sit to stand  Transfer Device 1: Walker  Transfer Level of Assistance 1: Minimum assistance  Trials/Comments 1: Pt demonstrates UE push from the bed to stand. Pt loses balance posteriorly requiring assist and FWW support. Pt reports continued dizziness in standing.  Transfers 2  Transfer From 2: Stand to  Transfer to 2: Bed  Technique 2: Stand to sit  Transfer Device 2: Walker  Transfer Level of Assistance 2: Minimum assistance, Moderate verbal cues  Trials/Comments 2: VC for BLE positioning against the bed with FWW in closer proximity before sitting. PT leaves FWW behind with decreased control on descent to the bed.    Ambulation/Gait Training  Ambulation/Gait Training Performed: Yes  Ambulation/Gait Training 1  Surface 1: Level tile  Device 1: Rolling walker  Assistance 1: Minimum assistance, Minimal verbal cues  Quality of Gait 1: Narrow base of support, Forward flexed posture  Comments/Distance (ft) 1: About 15 ft with dcreased step length and sindi. Pt in flexion at trunk and head/neck with gaze directed at the ground. VC for forward gaze as well as proper spacing from FWW. Decreased balance noted during turns with FWW. Pt declines further ambulation with report of continued dizziness.    Stairs  Stairs: No  Extremity/Trunk Assessments:  RLE   RLE : Exceptions to WFL  Strength RLE  R Hip Flexion: 4-/5  R Knee Flexion: 4/5  R Ankle Dorsiflexion:  (Pt declined AROM assessment)  R Ankle Plantar Flexion:  (Pt declined AROM assessment)  LLE   LLE : Exceptions to WFL  Strength LLE  L Hip  Flexion: 4-/5  L Knee Extension: 4/5  L Ankle Dorsiflexion: 4/5  L Ankle Plantar Flexion: 3+/5  Outcome Measures:  [facility] Basic Mobility  Turning from your back to your side while in a flat bed without using bedrails: A little  Moving from lying on your back to sitting on the side of a flat bed without using bedrails: A little  Moving to and from bed to chair (including a wheelchair): A little  Standing up from a chair using your arms (e.g. wheelchair or bedside chair): A little  To walk in hospital room: A little  Climbing 3-5 steps with railing: Total  Basic Mobility - Total Score: 16    Encounter Problems       Encounter Problems (Active)       Balance       Goal 1 (Progressing)       Start:  10/24/24    Expected End:  11/07/24       Pt performs all sitting balance IND and standing balance with close supervision using FWW            Mobility       STG - Patient will ambulate (Progressing)       Start:  10/24/24    Expected End:  11/07/24       50 ft with close supervision using FWW            PT Transfers       STG - Patient to transfer to and from sit to supine (Progressing)       Start:  10/24/24    Expected End:  11/07/24       IND         STG - Patient will transfer sit to and from stand (Progressing)       Start:  10/24/24    Expected End:  11/07/24       Mod IND using FWW              Education Documentation  Precautions, taught by Joe Trujillo PT at 10/24/2024  4:22 PM.  Learner: Patient  Readiness: Acceptance  Method: Explanation, Demonstration  Response: Needs Reinforcement    Body Mechanics, taught by Joe Trujillo PT at 10/24/2024  4:22 PM.  Learner: Patient  Readiness: Acceptance  Method: Explanation, Demonstration  Response: Needs Reinforcement    Mobility Training, taught by Joe Trujillo PT at 10/24/2024  4:22 PM.  Learner: Patient  Readiness: Acceptance  Method: Explanation, Demonstration  Response: Needs Reinforcement    Education Comments  No comments found.

## 2024-10-24 NOTE — PROGRESS NOTES
Occupational Therapy    Evaluation    Patient Name: Brendan Rob  MRN: [id number]  Department: [place] [address]  Room: [address] [place] [address]  Today's Date: 10/24/2024  Time Calculation  Start Time: 1549  Stop Time: 1559  Time Calculation (min): 10 min        Assessment:  OT Assessment: Pt presents to OT this date and demos decreased balance, strength, endurance and cognition/safety awareness resulting in decreased safety and independence with ADLs/IADLs. Pt requires skilled OT services to address above deficits to safely return to OF.  Prognosis: Good  Evaluation/Treatment Tolerance: Patient limited by fatigue (+ dizziness)  Medical Staff Made Aware: Yes  End of Session Communication: Bedside nurse  End of Session Patient Position: Bed, 3 rail up, Alarm on  OT Assessment Results: Decreased ADL status, Decreased upper extremity strength, Decreased safe judgment during ADL, Decreased cognition, Decreased endurance, Decreased functional mobility, Decreased IADLs, Decreased trunk control for functional activities  Prognosis: Good  Evaluation/Treatment Tolerance: Patient limited by fatigue (+ dizziness)  Medical Staff Made Aware: Yes  Strengths: Premorbid level of function, Support and attitude of living partners  Barriers to Participation: Comorbidities, Insight into problems  Plan:  Treatment Interventions: ADL retraining, Functional transfer training, UE strengthening/ROM, Endurance training, Patient/family training, Equipment evaluation/education, Neuromuscular reeducation, Compensatory technique education  OT Frequency: 3 times per week  OT Discharge Recommendations: Moderate intensity level of continued care  Equipment Recommended upon Discharge:  (TBD)  OT Recommended Transfer Status: Minimal assist, Assist of 1  OT - OK to Discharge: Yes (OT POC established this date)  Treatment Interventions: ADL retraining, Functional transfer training, UE strengthening/ROM, Endurance training, Patient/family training, Equipment  evaluation/education, Neuromuscular reeducation, Compensatory technique education    Subjective     General:  General  Reason for Referral: 74 y/o M presenting with generalized weakness and dizziness. Pt discoverd to have MCA aneurysm  Referred By: Adryan  Past Medical History Relevant to Rehab:   Past Medical History:   Diagnosis Date    Abnormal weight loss 04/25/2015    Weight loss    Acute upper respiratory infection, unspecified 01/04/2014    Acute upper respiratory infection    Encounter for immunization 09/18/2020    Need for prophylactic vaccination and inoculation against influenza    Encounter for screening for malignant neoplasm of prostate 04/25/2015    Screening for prostate cancer    Encounter for screening for malignant neoplasm of prostate 06/29/2013    Encounter for screening for malignant neoplasm of prostate    Personal history of other diseases of urinary system 06/29/2013    History of acute renal failure    Personal history of other endocrine, nutritional and metabolic disease     History of high cholesterol      Family/Caregiver Present: No  Co-Treatment: PT  Co-Treatment Reason: to maximize safety and participation with skilled intervention  Prior to Session Communication: Bedside nurse  Patient Position Received: Bed, 3 rail up, Alarm on  General Comment: Pt supine in bed upon arrival and agreeable to OT Eval. Pt fully participatory, limited by dizziness. Pt demos balance deficits and decreased safety awareness.  Precautions:  Medical Precautions: Fall precautions            Pain:  Pain Assessment  Pain Assessment: 0-10  0-10 (Numeric) Pain Score: 0 - No pain    Objective   Cognition:  Orientation Level: Oriented X4  Insight: Mild  Impulsive: Moderately           Home Living:  Type of Home: House  Lives With: Spouse  Home Adaptive Equipment: Walker rolling or standard  Home Layout: Two level, Able to live on main level with bedroom/bathroom  Home Access: Stairs to enter without  rails  Entrance Stairs-Rails: None  Entrance Stairs-Number of Steps: 2  Bathroom Shower/Tub: Walk-in shower  Bathroom Toilet: Handicapped height  Bathroom Equipment: Raised toilet seat without rails  Prior Function:  Level of [place]: Independent with ADLs and functional transfers, Independent with homemaking with ambulation  Receives Help From: Family  ADL Assistance: Independent  Homemaking Assistance: Independent (shares with spouse, pt reports Mod I laundry, spouse does meal prep)  Ambulatory Assistance: Independent (no AD)  Vocational: Retired (pt is a retired )  Hand Dominance: Right  Prior Function Comments: + driving. pt reports x1 fall prior to admission       ADL:  Eating Assistance: Independent (anticipated)  Grooming Deficit: Setup (anticipated)  LE Dressing Assistance: Moderate  LE Dressing Deficit: Don/doff R sock, Don/doff L sock  Activity Tolerance:  Endurance: Decreased tolerance for upright activites  Activity Tolerance Comments: pt reports dizziness with OOB activity  Bed Mobility/Transfers: Bed Mobility  Bed Mobility: Yes  Bed Mobility 1  Bed Mobility 1: Supine to sitting, Sitting to supine  Level of Assistance 1: Close supervision, Moderate verbal cues  Bed Mobility Comments 1: HOB elevated  Bed Mobility 2  Bed Mobility  2: Scooting  Level of Assistance 2: Contact guard, Minimal verbal cues  Bed Mobility Comments 2: lateral scoting to R side towards HOB, pt impulsive, cues for safety    Transfers  Transfer: Yes  Transfer 1  Technique 1: Sit to stand, Stand to sit  Transfer Device 1: Walker  Transfer Level of Assistance 1: Minimum assistance, Moderate verbal cues  Trials/Comments 1: cues for sequencing, safe hand placement and walker safety, pt impulsive, pt demos posterior LOB during sit>stand with assist to correct      Functional Mobility:  Functional Mobility  Functional Mobility Performed: Yes  Functional Mobility 1  Comments 1: Pt functionally navigated x min household  distance in room using FWW with Min A x1. Cues for sequencing, upright posture & walker safety. Pt demos multiple LOB without incident, assist to correct.  Sitting Balance:  Static Sitting Balance  Static Sitting-Balance Support: Bilateral upper extremity supported, Feet supported  Static Sitting-Level of Assistance: Close supervision  Static Sitting-Comment/Number of Minutes: at EOB  Dynamic Sitting Balance  Dynamic Sitting-Comments: Min A d/t retrolean, cues for upright posture  Standing Balance:  Static Standing Balance  Static Standing-Balance Support: Bilateral upper extremity supported  Static Standing-Level of Assistance: Contact guard, Minimum assistance  Static Standing-Comment/Number of Minutes: d/t posterior LOB, cues for upright posture  Dynamic Standing Balance  Dynamic Standing-Comments: Min A        Vision:Vision - Basic Assessment  Current Vision: No visual deficits (per pt report)      Sensation:  Light Touch: No apparent deficits  Strength:  Strength Comments: B UEs grossly 3/5 based on function       Coordination:  Movements are Fluid and Coordinated: Yes        Extremities: RUE   RUE : Within Functional Limits and LUE   LUE: Within Functional Limits      Outcome Measures:[facility] Daily Activity  Putting on and taking off regular lower body clothing: A lot  Bathing (including washing, rinsing, drying): A lot  Putting on and taking off regular upper body clothing: A little  Toileting, which includes using toilet, bedpan or urinal: A lot  Taking care of personal grooming such as brushing teeth: A little  Eating Meals: None  Daily Activity - Total Score: 16        Education Documentation  Body Mechanics, taught by Ria Loza OT at 10/24/2024  4:29 PM.  Learner: Patient  Readiness: Acceptance  Method: Explanation  Response: Verbalizes Understanding, Needs Reinforcement    Precautions, taught by Ria Loza OT at 10/24/2024  4:29 PM.  Learner: Patient  Readiness: Acceptance  Method:  Explanation  Response: Verbalizes Understanding, Needs Reinforcement    ADL Training, taught by Ria Loza OT at 10/24/2024  4:29 PM.  Learner: Patient  Readiness: Acceptance  Method: Explanation  Response: Verbalizes Understanding, Needs Reinforcement    Education Comments  No comments found.           Goals:  Encounter Problems       Encounter Problems (Active)       ADLs       Patient will perform UB and LB bathing with supervision level of assistance and grab bars, shower chair, and long-handled sponge.       Start:  10/24/24    Expected End:  11/07/24            Patient with complete upper body dressing with modified independent level of assistance donning and doffing all UE clothes with PRN adaptive equipment.       Start:  10/24/24    Expected End:  11/07/24            Patient with complete lower body dressing with stand by assist level of assistance donning and doffing all LE clothes  with PRN adaptive equipment.       Start:  10/24/24    Expected End:  11/07/24            Patient will complete daily grooming tasks with modified independent level of assistance and PRN adaptive equipment.       Start:  10/24/24    Expected End:  11/07/24            Patient will complete toileting including hygiene clothing management/hygiene with stand by assist level of assistance and raised toilet seat and grab bars.       Start:  10/24/24    Expected End:  11/07/24               MOBILITY       Patient will perform Functional mobility x Household distances/Community Distances with stand by assist level of assistance and least restrictive device in order to improve safety and functional mobility.       Start:  10/24/24    Expected End:  11/07/24               TRANSFERS       Patient will perform bed mobility independent level of assistance in order to improve safety and independence with mobility       Start:  10/24/24    Expected End:  11/07/24            Patient will complete functional transfers with least  restrictive device with Supervision level of assistance.       Start:  10/24/24    Expected End:  11/07/24

## 2024-10-24 NOTE — PROGRESS NOTES
10/24/24 8572   Discharge Planning   Home or Post Acute Services Post acute facilities (Rehab/SNF/etc);In home services   Type of Post Acute Facility Services Skilled nursing     Met with patient at bedside to discuss discharge plans.  Explained that patient will work with therapy today and they will give their recommendations for next LOC.  Patient is agreeable to SNF placement if it is recommended by therapy.  Reviewed SNF list from [facility] with patient.  Asked him to discuss with his wife and be ready to give me 4-5 facilities for referrrals later this afternoon.     1500 Patient sleeping at this time.  Will attempt again to get choices.

## 2024-10-24 NOTE — CARE PLAN
The clinical goals for the shift include remain free from fall/inury throughout the shift    Problem: Pain - Adult  Goal: Verbalizes/displays adequate comfort level or baseline comfort level  Outcome: Progressing     Problem: Safety - Adult  Goal: Free from fall injury  Outcome: Progressing     Problem: Discharge Planning  Goal: Discharge to home or other facility with appropriate resources  Outcome: Progressing     Problem: Chronic Conditions and Co-morbidities  Goal: Patient's chronic conditions and co-morbidity symptoms are monitored and maintained or improved  Outcome: Progressing     Problem: Fall/Injury  Goal: Not fall by end of shift  Outcome: Progressing  Goal: Be free from injury by end of the shift  Outcome: Progressing  Goal: Use assistive devices by end of the shift  Outcome: Progressing  Goal: Pace activities to prevent fatigue by end of the shift  Outcome: Progressing

## 2024-10-24 NOTE — ASSESSMENT & PLAN NOTE
"Brendan Rob is a 73 y.o. male with PMHx of DM II, CKD III, HTN, Afib-not on AC, SVT, COPD, PVD who is admitted to observation for generalized weakness and dizziness. Pt stated both the dizziness and weakness started yesterday AM prompting him to visit the ED. Since arrival the dizziness has improved significantly but he still feels a \"room-spinning\" sensation from time to time. He still feels week. Denies any pain, recent illness, N/V/C/D.   ED course: BP with intermittent HTN otherwise VSS.   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 Smith (neurologist) OP  - PT/OT ordered     DM II  - Blood sugar stable  - Continue home glimepiride    HTN  - Well controlled on home meds     Afib  CAD  - Not on AC   - Continue home meds     Other comorbidities as above  - Continue medications as ordered and adjust based on clinical course      VTE / GI prophylaxis   - Subcutaneous Lovenox, bowel regimen in place      Discharge planning  - Pending PT/OT     Discussed with Dr. Quiroz and the interdisciplinary team    "

## 2024-10-24 NOTE — H&P
"History Of Present Illness  Brendan Rob is a 73 y.o. male with PMHx of DM II, CKD III, HTN, Afib-not on AC, SVT, COPD, PVD who is admitted to observation for generalized weakness and dizziness. Pt stated both the dizziness and weakness started yesterday AM prompting him to visit the ED. Since arrival the dizziness has improved significantly but he still feels a \"room-spinning\" sensation from time to time. He still feels week. Denies any pain, recent illness, N/V/C/D.   ED course: BP with intermittent HTN otherwise VSS.   Labs largely unremarkable   Lipase 89, HST 9, 10,   TSH 1.12   UA negative   CXR - negative for acute process within limitation of rotation   CTA head and neck - concern for saccular aneurysm projecting superiorly from the M1 segment right middle cerebral artery measuring 2.6 x 2.3 x 2.9 and is directed superiorly     Past Medical History  He has a past medical history of Abnormal weight loss (04/25/2015), Acute upper respiratory infection, unspecified (01/04/2014), Encounter for immunization (09/18/2020), Encounter for screening for malignant neoplasm of prostate (04/25/2015), Encounter for screening for malignant neoplasm of prostate (06/29/2013), Personal history of other diseases of urinary system (06/29/2013), and Personal history of other endocrine, nutritional and metabolic disease.    Surgical History  He has a past surgical history that includes Colonoscopy (03/08/2013) and Other surgical history (11/01/2019).     Social History  He reports that he quit smoking about 4 years ago. His smoking use included cigarettes. He started smoking about 28 years ago. He has a 25 pack-year smoking history. He has been exposed to tobacco smoke. He has never used smokeless tobacco. He reports that he does not currently use alcohol. He reports that he does not currently use drugs.    Family History  No family history on file.     Allergies  Patient has no known allergies.    Review of Systems "   Constitutional:  Negative for chills, diaphoresis, fatigue and fever.   HENT:  Negative for congestion, rhinorrhea and sore throat.    Respiratory:  Negative for cough, chest tightness and shortness of breath.    Cardiovascular:  Negative for chest pain, palpitations and leg swelling.   Gastrointestinal:  Negative for abdominal pain, constipation, diarrhea, nausea and vomiting.   Genitourinary:  Negative for difficulty urinating and dysuria.   Neurological:  Positive for dizziness and weakness. Negative for syncope, light-headedness and headaches.        Physical Exam  Constitutional:       General: He is not in acute distress.     Appearance: Normal appearance. He is not ill-appearing or toxic-appearing.   HENT:      Head: Normocephalic and atraumatic.   Cardiovascular:      Rate and Rhythm: Normal rate and regular rhythm.      Pulses: Normal pulses.      Heart sounds: Normal heart sounds. No murmur heard.     No friction rub. No gallop.   Pulmonary:      Effort: Pulmonary effort is normal.      Breath sounds: Normal breath sounds. No wheezing, rhonchi or rales.   Abdominal:      General: There is no distension.      Palpations: There is no mass.      Tenderness: There is no abdominal tenderness.   Musculoskeletal:      Right lower leg: No edema.      Left lower leg: No edema.   Skin:     General: Skin is warm and dry.   Neurological:      General: No focal deficit present.      Mental Status: He is alert and oriented to person, place, and time.   Psychiatric:         Mood and Affect: Mood normal.         Behavior: Behavior normal.         Thought Content: Thought content normal.          Last Recorded Vitals  /54 (BP Location: Right arm, Patient Position: Lying)   Pulse 53   Temp 36.6 °C (97.9 °F) (Temporal)   Resp 17   Wt 95.3 kg (210 lb)   SpO2 97%     Relevant Results  Scheduled medications  albuterol, 2.5 mg, nebulization, TID  atorvastatin, 80 mg, oral, Nightly  clopidogrel, 75 mg, oral,  Daily  enoxaparin, 40 mg, subcutaneous, q24h  ezetimibe, 10 mg, oral, Daily  glimepiride, 1 mg, oral, Daily  lisinopril 20 mg, hydroCHLOROthiazide 12.5 mg for Zestoretic/Prinizide, , oral, Daily  metoprolol succinate XL, 25 mg, oral, Daily  polyethylene glycol, 17 g, oral, Daily      Continuous medications     PRN medications  PRN medications: acetaminophen **OR** acetaminophen **OR** acetaminophen, albuterol, meclizine, melatonin, ondansetron **OR** ondansetron  Results for orders placed or performed during the hospital encounter of 10/23/24 (from the past 24 hours)   CBC and Auto Differential   Result Value Ref Range    WBC 5.4 4.4 - 11.3 x10*3/uL    nRBC 0.0 0.0 - 0.0 /100 WBCs    RBC 4.44 (L) 4.50 - 5.90 x10*6/uL    Hemoglobin 12.8 (L) 13.5 - 17.5 g/dL    Hematocrit 37.7 (L) 41.0 - 52.0 %    MCV 85 80 - 100 fL    MCH 28.8 26.0 - 34.0 pg    MCHC 34.0 32.0 - 36.0 g/dL    RDW 14.6 (H) 11.5 - 14.5 %    Platelets 253 150 - 450 x10*3/uL    Neutrophils % 62.8 40.0 - 80.0 %    Immature Granulocytes %, Automated 0.2 0.0 - 0.9 %    Lymphocytes % 27.3 13.0 - 44.0 %    Monocytes % 6.3 2.0 - 10.0 %    Eosinophils % 2.8 0.0 - 6.0 %    Basophils % 0.6 0.0 - 2.0 %    Neutrophils Absolute 3.41 1.60 - 5.50 x10*3/uL    Immature Granulocytes Absolute, Automated 0.01 0.00 - 0.50 x10*3/uL    Lymphocytes Absolute 1.48 0.80 - 3.00 x10*3/uL    Monocytes Absolute 0.34 0.05 - 0.80 x10*3/uL    Eosinophils Absolute 0.15 0.00 - 0.40 x10*3/uL    Basophils Absolute 0.03 0.00 - 0.10 x10*3/uL   Comprehensive metabolic panel   Result Value Ref Range    Glucose 123 (H) 74 - 99 mg/dL    Sodium 139 136 - 145 mmol/L    Potassium 4.0 3.5 - 5.3 mmol/L    Chloride 110 (H) 98 - 107 mmol/L    Bicarbonate 21 21 - 32 mmol/L    Anion Gap 12 10 - 20 mmol/L    Urea Nitrogen 16 6 - 23 mg/dL    Creatinine 1.22 0.50 - 1.30 mg/dL    eGFR 63 >60 mL/min/1.73m*2    Calcium 7.8 (L) 8.6 - 10.3 mg/dL    Albumin 3.4 3.4 - 5.0 g/dL    Alkaline Phosphatase 57 33 - 136 U/L     Total Protein 5.7 (L) 6.4 - 8.2 g/dL    AST 26 9 - 39 U/L    Bilirubin, Total 0.5 0.0 - 1.2 mg/dL    ALT 8 (L) 10 - 52 U/L   Magnesium   Result Value Ref Range    Magnesium 1.80 1.60 - 2.40 mg/dL   Lipase   Result Value Ref Range    Lipase 89 (H) 9 - 82 U/L   B-Type Natriuretic Peptide   Result Value Ref Range    BNP 56 0 - 99 pg/mL   TSH with reflex to Free T4 if abnormal   Result Value Ref Range    Thyroid Stimulating Hormone 1.12 0.44 - 3.98 mIU/L   Troponin I, High Sensitivity, Initial   Result Value Ref Range    Troponin I, High Sensitivity 9 0 - 20 ng/L   ECG 12 lead   Result Value Ref Range    Ventricular Rate 64 BPM    Atrial Rate 64 BPM    NV Interval 176 ms    QRS Duration 88 ms    QT Interval 394 ms    QTC Calculation(Bazett) 406 ms    P Axis -13 degrees    R Axis -13 degrees    T Axis -18 degrees    QRS Count 11 beats    Q Onset 221 ms    T Offset 418 ms    QTC Fredericia 402 ms   Troponin, High Sensitivity, 1 Hour   Result Value Ref Range    Troponin I, High Sensitivity 10 0 - 20 ng/L   Electrocardiogram, 12-lead PRN ACS symptoms   Result Value Ref Range    Ventricular Rate 62 BPM    Atrial Rate 62 BPM    NV Interval 170 ms    QRS Duration 94 ms    QT Interval 402 ms    QTC Calculation(Bazett) 408 ms    P Axis 54 degrees    R Axis 54 degrees    T Axis 62 degrees    QRS Count 9 beats    Q Onset 218 ms    P Onset 133 ms    P Offset 185 ms    T Offset 419 ms    QTC Fredericia 406 ms   Urinalysis with Reflex Culture and Microscopic   Result Value Ref Range    Color, Urine Light-Yellow Light-Yellow, Yellow, Dark-Yellow    Appearance, Urine Clear Clear    Specific Gravity, Urine 1.012 1.005 - 1.035    pH, Urine 6.0 5.0, 5.5, 6.0, 6.5, 7.0, 7.5, 8.0    Protein, Urine NEGATIVE NEGATIVE, 10 (TRACE), 20 (TRACE) mg/dL    Glucose, Urine Normal Normal mg/dL    Blood, Urine NEGATIVE NEGATIVE    Ketones, Urine NEGATIVE NEGATIVE mg/dL    Bilirubin, Urine NEGATIVE NEGATIVE    Urobilinogen, Urine Normal Normal mg/dL     Nitrite, Urine NEGATIVE NEGATIVE    Leukocyte Esterase, Urine NEGATIVE NEGATIVE   Drug Screen, Urine   Result Value Ref Range    Amphetamine Screen, Urine Presumptive Negative Presumptive Negative    Barbiturate Screen, Urine Presumptive Negative Presumptive Negative    Benzodiazepines Screen, Urine Presumptive Negative Presumptive Negative    Cannabinoid Screen, Urine Presumptive Negative Presumptive Negative    Cocaine Metabolite Screen, Urine Presumptive Negative Presumptive Negative    Fentanyl Screen, Urine Presumptive Negative Presumptive Negative    Opiate Screen, Urine Presumptive Negative Presumptive Negative    Oxycodone Screen, Urine Presumptive Negative Presumptive Negative    PCP Screen, Urine Presumptive Negative Presumptive Negative    Methadone Screen, Urine Presumptive Negative Presumptive Negative   POCT GLUCOSE   Result Value Ref Range    POCT Glucose 80 74 - 99 mg/dL   Basic metabolic panel   Result Value Ref Range    Glucose 115 (H) 74 - 99 mg/dL    Sodium 138 136 - 145 mmol/L    Potassium 4.0 3.5 - 5.3 mmol/L    Chloride 106 98 - 107 mmol/L    Bicarbonate 25 21 - 32 mmol/L    Anion Gap 11 10 - 20 mmol/L    Urea Nitrogen 18 6 - 23 mg/dL    Creatinine 1.26 0.50 - 1.30 mg/dL    eGFR 60 (L) >60 mL/min/1.73m*2    Calcium 8.1 (L) 8.6 - 10.3 mg/dL   CBC   Result Value Ref Range    WBC 6.6 4.4 - 11.3 x10*3/uL    nRBC 0.0 0.0 - 0.0 /100 WBCs    RBC 4.15 (L) 4.50 - 5.90 x10*6/uL    Hemoglobin 12.2 (L) 13.5 - 17.5 g/dL    Hematocrit 36.1 (L) 41.0 - 52.0 %    MCV 87 80 - 100 fL    MCH 29.4 26.0 - 34.0 pg    MCHC 33.8 32.0 - 36.0 g/dL    RDW 14.6 (H) 11.5 - 14.5 %    Platelets 238 150 - 450 x10*3/uL     Electrocardiogram, 12-lead PRN ACS symptoms    Result Date: 10/24/2024  Sinus rhythm with marked sinus arrhythmia Nonspecific T wave abnormality Abnormal ECG When compared with ECG of 23-OCT-2024 11:31, (unconfirmed) Premature ventricular complexes are no longer Present Premature supraventricular  complexes are no longer Present Criteria for Inferior infarct are no longer Present T wave inversion no longer evident in Inferior leads    MR brain w and wo IV contrast    Result Date: 10/24/2024  Interpreted By:  Mane Alcazar, STUDY: MR BRAIN W AND WO IV CONTRAST;  10/23/2024 9:02 pm   INDICATION: Signs/Symptoms:dizziness despite medication r/o post circ stroke.   COMPARISON: Head CT 10/23/2024.   ACCESSION NUMBER(S): [id number]   ORDERING CLINICIAN: SAÚL GUIDRY   TECHNIQUE: Axial T2, FLAIR, DWI, gradient echo T2 and axial T1 weighted images of brain were acquired.  Postcontrast images were obtained after administration of 20 mL Dotarem intravenous contrast.   FINDINGS: Brain MRI: There is no evidence of acute infarction. There are no extra-axial collections. There is no mass lesion and no midline shift. There is no hydrocephalus. Generalized cerebral volume loss and associated ventricular and sulcal enlargement. Mild periventricular and deep white matter FLAIR hyperintensities suggestive of mild chronic microvascular ischemic change. No evidence of abnormal intracranial enhancement. Intracranial flow voids are grossly maintained.   Paranasal Sinuses and Mastoids: Visualized paranasal sinuses are well aerated. The mastoid air cells are clear. The orbits are grossly normal.           No evidence of acute infarct, intracranial mass effect or midline shift. No evidence of abnormal intracranial enhancement.   Signed by: Mane Alcazar 10/24/2024 1:01 AM Dictation workstation:   [id number]    CT angio head and neck w and wo IV contrast    Result Date: 10/23/2024  Interpreted By:  Sravan Dos Santos, STUDY: CT ANGIO HEAD AND NECK W AND WO IV CONTRAST;  10/23/2024 5:34 pm   INDICATION: Signs/Symptoms:dizziness despite meclizine.     COMPARISON: MRI brain 08/03/2024   ACCESSION NUMBER(S): [id number]   ORDERING CLINICIAN: ALVAREZ HIGUERA   TECHNIQUE: Unenhanced CT images of the head were obtained. Subsequently, 75 ML  of Omnipaque 350 was administered intravenously and axial images of the head and neck were acquired.  Coronal, sagittal, and 3-D reconstructions were provided for review.   FINDINGS:   CTA COW: No major vascular occlusion. Moderate atherosclerotic calcifications through the carotid siphons. Saccular aneurysm projecting superiorly from the M1 segment right middle cerebral artery measures 2.6 x 2.3 x 2.9 mm and is directed superiorly. No vascular malformations. Patent dural venous sinuses and intracranial deep venous structures.   CTA CAROTID: No aortic aneurysm or dissection. No significant stenoses at the origins of the great vessels from the aortic arch. No significant stenoses of the brachycephalic artery or bilateral subclavian arteries.   Moderate atherosclerotic involvement of left-greater-than-right carotid bifurcations. On the left narrowest luminal diameter of the proximal left internal carotid artery is 1.7 mm, compared to 3.4 mm more distally. On the right there is very mild stenosis. No significant plaque irregularity on either side.   No significant stenoses bilateral vertebral arterial systems. Note that the V1 segment of the right vertebral artery is somewhat obscured by artifact generated by contrast material in the adjacent venous structures.   NONVASCULAR HEAD: No intracranial mass. No intracranial hemorrhage. No evidence of large evolving cortical infarction. Supratentorial white matter hypodensities most likely related to chronic small vessel ischemic change.     Bones intact.   NONVASCULAR NECK: No soft tissue mass. No adenopathy. Salivary glands are unremarkable. Thyroid gland unremarkable. Bones intact.         1. No intracranial major vascular occlusion. 2. No flow-limiting stenosis or significant plaque irregularity in the neck. 3. Saccular aneurysm projecting superiorly from the M1 segment right middle cerebral artery measures 2.6 x 2.3 x 2.9 mm and is directed superiorly. 4. Approximately  "50% atherosclerotic short-segment stenosis proximal left internal carotid artery. 5. Supratentorial white matter hypodensities most likely related to chronic small vessel ischemic change.   MACRO: None   Signed by: Sravan Dos Santos 10/23/2024 6:02 PM Dictation workstation:   [id number]    ECG 12 lead    Result Date: 10/23/2024  Sinus rhythm with Premature supraventricular complexes and with occasional Premature ventricular complexes Inferior infarct , age undetermined Abnormal ECG When compared with ECG of 22-DEC-2022 09:42, Previous ECG has undetermined rhythm, needs review Inferior infarct is now Present T wave inversion now evident in Inferior leads    XR chest 1 view    Result Date: 10/23/2024  Interpreted By:  Melchor Srivastava, STUDY: XR CHEST 1 VIEW; 10/23/2024 12:16 pm   INDICATION: Signs/Symptoms:weakness   COMPARISON: December 2022   ACCESSION NUMBER(S): [id number]   ORDERING CLINICIAN: ALVAREZ HIGUERA   FINDINGS: The study is limited due to  rotation. The cardiomediastinal silhouette is within normal limits for the technique. There is no pneumothorax, confluent infiltrates or significant effusion. Degenerative changes and scoliosis involve the spine.       Allowing for the aforementioned limitation, no acute cardiopulmonary disease.   Signed by: Melchor Srivastava 10/23/2024 12:34 PM Dictation workstation:   [id number]            Assessment/Plan   Assessment & Plan  Dizziness  Brendan Rob is a 73 y.o. male with PMHx of DM II, CKD III, HTN, Afib-not on AC, SVT, COPD, PVD who is admitted to observation for generalized weakness and dizziness. Pt stated both the dizziness and weakness started yesterday AM prompting him to visit the ED. Since arrival the dizziness has improved significantly but he still feels a \"room-spinning\" sensation from time to time. He still feels week. Denies any pain, recent illness, N/V/C/D.   ED course: BP with intermittent HTN otherwise VSS.   Labs largely unremarkable   Lipase 89, " HST 9, 10,   TSH 1.12   UA negative   CXR - negative for acute process within limitation of rotation   CTA head and neck - concern for saccular aneurysm projecting superiorly from the M1 segment right middle cerebral artery measuring 2.6 x 2.3 x 2.9 and is directed superiorly    Generalized weakness  Dizziness  - CTA head and neck with concern for aneurysm, will consult neurosurgery   - MRI brain w and wo - no evidence of acute infarct, intracranial mass effect, midline shift, or abnormal intracranial enhancement  - UA and UDS negative   - TSH WNL   - Pt follows with Dr. Smith (neurologist) OP  - PT/OT ordered     DM II  - Blood sugar stable  - Continue home glimepiride    HTN  - Well controlled on home meds     Afib  CAD  - Not on AC   - Continue home meds     Other comorbidities as above  - Continue medications as ordered and adjust based on clinical course      VTE / GI prophylaxis   - Subcutaneous Lovenox, bowel regimen in place      Discharge planning  - Pending PT/OT     Discussed with Dr. Quiroz and the interdisciplinary team        Rochelle Cornelius PA-C

## 2024-10-24 NOTE — PROGRESS NOTES
10/24/24 0802   [facility] Disability Status   Are you deaf or do you have serious difficulty hearing? N   Are you blind or do you have serious difficulty seeing, even when wearing glasses? N   Because of a physical, mental, or emotional condition, do you have serious difficulty concentrating, remembering, or making decisions? (5 years old or older) N   Do you have serious difficulty walking or climbing stairs? N   Do you have serious difficulty dressing or bathing? N   Because of a physical, mental, or emotional condition, do you have serious difficulty doing errands alone such as visiting the doctor? N

## 2024-10-24 NOTE — SIGNIFICANT EVENT
Neurosurgery recs:    Patient is a 73-year-old with history of depression, anxiety, memory loss, peripheral vascular disease on Plavix, diabetes, COPD, stage III chronic kidney disease, paroxysmal supraventricular tachycardia, presented with generalized weakness and dizziness.    Of note he has been seen by neurology in the past few months for some mild cognitive decline.     On workup of dizziness to rule out stroke CT head as well as CTA head and neck was obtained that revealed an incidentally found 2.6 mm right MCA aneurysm.    Per report there is no history of severe headache, loss of consciousness.    MRI brain with and without contrast was also obtained which was negative for any intracranial hemorrhage.    Recs:  Imaging findings of right MCA aneurysm is incidental.  We will discuss patient in our cerebrovascular conference and will reach out to them for any outpatient follow-up.

## 2024-10-24 NOTE — PROGRESS NOTES
Social work consult placed for positive medical risk screen. SW reviewed pt's chart. No SW needs foreseen at this time. SW signing off; available upon request.

## 2024-10-24 NOTE — PROGRESS NOTES
Pharmacy Medication History Review    Per patient.     Brendan Rob is a 73 y.o. male admitted for Dizziness. Pharmacy reviewed the patient's [id number] medications and allergies for accuracy.    The list below reflectives the updated PTA list. Please review each medication in order reconciliation for additional clarification and justification.       The list below reflectives the updated allergy list. Please review each documented allergy for additional clarification and justification.  Allergies  Reviewed by Maria Ines Grace RN on 10/23/2024   No Known Allergies         Below are additional concerns with the patient's PTA list.  Prior to Admission Medications   Prescriptions Last Dose Informant   albuterol 90 mcg/actuation inhaler     Sig: USE 2 INHALATIONS BY MOUTH EVERY 6 HOURS AS NEEDED FOR WHEEZING   Patient taking differently: Inhale 2 puffs every 6 hours if needed for wheezing or shortness of breath.   atorvastatin (Lipitor) 80 mg tablet 10/22/2024 Evening    Sig: TAKE 1 TABLET BY MOUTH ONCE  DAILY AT BEDTIME   blood sugar diagnostic (Blood Glucose Test) strip     Sig: USE AS DIRECTED.   clopidogrel (Plavix) 75 mg tablet 10/22/2024    Sig: TAKE 1 TABLET BY MOUTH ONCE  DAILY   ezetimibe (Zetia) 10 mg tablet 10/22/2024    Sig: Take 1 tablet (10 mg) by mouth once daily.   glimepiride (Amaryl) 1 mg tablet 10/22/2024    Sig: TAKE 1 TABLET BY MOUTH ONCE  DAILY   lisinopriL-hydrochlorothiazide 20-12.5 mg tablet 10/22/2024    Sig: TAKE 1 TABLET BY MOUTH ONCE  DAILY   metoprolol succinate XL (Toprol-XL) 25 mg 24 hr tablet 10/22/2024    Sig: TAKE 1 TABLET BY MOUTH ONCE  DAILY DO NOT CRUSH OR CHEW      Facility-Administered Medications: None        Jenny Hernandez

## 2024-10-24 NOTE — PROGRESS NOTES
Transitional Care Coordination Progress Note:  Plan per Medical/Surgical team: treatment of dizziness with antivert   Status: Observation  Payor source: [facility]  Discharge disposition: Home with wife   Potential Barriers: lipase 89  ADOD: 10/24/2024   STACIE Pozo RN, BSN Transitional Care Coordinator ED# [phone]      10/24/24 0802   Discharge Planning   Living Arrangements Spouse/significant other   Support Systems Spouse/significant other   Assistance Needed antivert   Type of Residence Private residence   Number of Stairs to Enter Residence 3   Number of Stairs Within Residence 0   Home or Post Acute Services None   Expected Discharge Disposition Home   Does the patient need discharge transport arranged? No   Financial Resource Strain   How hard is it for you to pay for the very basics like food, housing, medical care, and heating? Not hard   Housing Stability   In the last 12 months, was there a time when you were not able to pay the mortgage or rent on time? N   In the past 12 months, how many times have you moved where you were living? 1   At any time in the past 12 months, were you homeless or living in a shelter (including now)? N   Transportation Needs   In the past 12 months, has lack of transportation kept you from medical appointments or from getting medications? no   In the past 12 months, has lack of transportation kept you from meetings, work, or from getting things needed for daily living? No

## 2024-10-25 ENCOUNTER — APPOINTMENT (OUTPATIENT)
Dept: CARDIOLOGY | Facility: CLINIC | Age: 74
End: 2024-10-25
Payer: MEDICARE

## 2024-10-25 ENCOUNTER — APPOINTMENT (OUTPATIENT)
Dept: CARDIOLOGY | Facility: HOSPITAL | Age: 74
End: 2024-10-25
Payer: MEDICARE

## 2024-10-25 LAB
ANION GAP SERPL CALC-SCNC: 11 MMOL/L (ref 10–20)
ATRIAL RATE: 60 BPM
BUN SERPL-MCNC: 23 MG/DL (ref 6–23)
CALCIUM SERPL-MCNC: 8.4 MG/DL (ref 8.6–10.3)
CHLORIDE SERPL-SCNC: 108 MMOL/L (ref 98–107)
CO2 SERPL-SCNC: 24 MMOL/L (ref 21–32)
CREAT SERPL-MCNC: 1.26 MG/DL (ref 0.5–1.3)
EGFRCR SERPLBLD CKD-EPI 2021: 60 ML/MIN/1.73M*2
ERYTHROCYTE [DISTWIDTH] IN BLOOD BY AUTOMATED COUNT: 14.7 % (ref 11.5–14.5)
GLUCOSE SERPL-MCNC: 107 MG/DL (ref 74–99)
HCT VFR BLD AUTO: 38.5 % (ref 41–52)
HGB BLD-MCNC: 13 G/DL (ref 13.5–17.5)
MCH RBC QN AUTO: 29.4 PG (ref 26–34)
MCHC RBC AUTO-ENTMCNC: 33.8 G/DL (ref 32–36)
MCV RBC AUTO: 87 FL (ref 80–100)
NRBC BLD-RTO: 0 /100 WBCS (ref 0–0)
P AXIS: 91 DEGREES
P OFFSET: 178 MS
P ONSET: 129 MS
PLATELET # BLD AUTO: 248 X10*3/UL (ref 150–450)
POTASSIUM SERPL-SCNC: 3.9 MMOL/L (ref 3.5–5.3)
PR INTERVAL: 176 MS
Q ONSET: 217 MS
QRS COUNT: 10 BEATS
QRS DURATION: 88 MS
QT INTERVAL: 404 MS
QTC CALCULATION(BAZETT): 404 MS
QTC FREDERICIA: 404 MS
R AXIS: 39 DEGREES
RBC # BLD AUTO: 4.42 X10*6/UL (ref 4.5–5.9)
SODIUM SERPL-SCNC: 139 MMOL/L (ref 136–145)
T AXIS: 63 DEGREES
T OFFSET: 419 MS
VENTRICULAR RATE: 60 BPM
WBC # BLD AUTO: 6.7 X10*3/UL (ref 4.4–11.3)

## 2024-10-25 PROCEDURE — 96372 THER/PROPH/DIAG INJ SC/IM: CPT

## 2024-10-25 PROCEDURE — 85027 COMPLETE CBC AUTOMATED: CPT

## 2024-10-25 PROCEDURE — 94640 AIRWAY INHALATION TREATMENT: CPT

## 2024-10-25 PROCEDURE — G0378 HOSPITAL OBSERVATION PER HR: HCPCS

## 2024-10-25 PROCEDURE — 80048 BASIC METABOLIC PNL TOTAL CA: CPT

## 2024-10-25 PROCEDURE — 2500000002 HC RX 250 W HCPCS SELF ADMINISTERED DRUGS (ALT 637 FOR MEDICARE OP, ALT 636 FOR OP/ED)

## 2024-10-25 PROCEDURE — 97116 GAIT TRAINING THERAPY: CPT | Mod: GP,CQ

## 2024-10-25 PROCEDURE — 2500000002 HC RX 250 W HCPCS SELF ADMINISTERED DRUGS (ALT 637 FOR MEDICARE OP, ALT 636 FOR OP/ED): Mod: MUE | Performed by: INTERNAL MEDICINE

## 2024-10-25 PROCEDURE — 36415 COLL VENOUS BLD VENIPUNCTURE: CPT

## 2024-10-25 PROCEDURE — 93005 ELECTROCARDIOGRAM TRACING: CPT

## 2024-10-25 PROCEDURE — 94760 N-INVAS EAR/PLS OXIMETRY 1: CPT

## 2024-10-25 PROCEDURE — 2500000004 HC RX 250 GENERAL PHARMACY W/ HCPCS (ALT 636 FOR OP/ED)

## 2024-10-25 PROCEDURE — 2500000001 HC RX 250 WO HCPCS SELF ADMINISTERED DRUGS (ALT 637 FOR MEDICARE OP)

## 2024-10-25 RX ORDER — MECLIZINE HYDROCHLORIDE 25 MG/1
50 TABLET ORAL ONCE
Status: COMPLETED | OUTPATIENT
Start: 2024-10-25 | End: 2024-10-25

## 2024-10-25 ASSESSMENT — PAIN SCALES - GENERAL
PAINLEVEL_OUTOF10: 0 - NO PAIN
PAINLEVEL_OUTOF10: 0 - NO PAIN

## 2024-10-25 ASSESSMENT — COGNITIVE AND FUNCTIONAL STATUS - GENERAL
STANDING UP FROM CHAIR USING ARMS: A LITTLE
MOVING FROM LYING ON BACK TO SITTING ON SIDE OF FLAT BED WITH BEDRAILS: A LITTLE
DAILY ACTIVITIY SCORE: 24
CLIMB 3 TO 5 STEPS WITH RAILING: A LITTLE
TURNING FROM BACK TO SIDE WHILE IN FLAT BAD: A LITTLE
MOBILITY SCORE: 16
CLIMB 3 TO 5 STEPS WITH RAILING: A LITTLE
WALKING IN HOSPITAL ROOM: A LITTLE
MOBILITY SCORE: 22
MOVING TO AND FROM BED TO CHAIR: A LITTLE
DAILY ACTIVITIY SCORE: 24
WALKING IN HOSPITAL ROOM: A LITTLE
MOBILITY SCORE: 22
WALKING IN HOSPITAL ROOM: A LITTLE
CLIMB 3 TO 5 STEPS WITH RAILING: TOTAL

## 2024-10-25 ASSESSMENT — PAIN - FUNCTIONAL ASSESSMENT: PAIN_FUNCTIONAL_ASSESSMENT: 0-10

## 2024-10-25 NOTE — PROGRESS NOTES
10/25/24 1410   Discharge Planning   Home or Post Acute Services Post acute facilities (Rehab/SNF/etc)   Type of Post Acute Facility Services Skilled nursing   Expected Discharge Disposition SNF     Pari spoke with patient and was able to get SNF choices.  Referrals sent in careport.  PT/OT to see over the weekend. Can start auth once there is an accepting.

## 2024-10-25 NOTE — PROGRESS NOTES
"Medicine PA-C follow up note    Subjective:  Pt feeling okay this morning. Still having some intermittent dizziness but has seen improvement since arrival    Additional information:      Vitals (Last 24 Hours):  Heart Rate:  [50-67]   Temp:  [35.6 °C (96.1 °F)-36.7 °C (98.1 °F)]   Resp:  [16-18]   BP: (125-156)/()   SpO2:  [95 %-99 %]       I have reviewed all imaging reports and labs pertinent to this visit / presenting problem    PHYSICAL EXAM:  Constitutional: NAD, alert and cooperative  Eyes: no icterus  ENMT: mucous membranes moist, no lesions  Head/Neck: supple  Respiratory/Thorax: CTA bilaterally, non-labored breathing, no cough, on RA  Cardiovascular: RRR, no murmurs heard  Gastrointestinal: ND/S/NT  : no Eagle, no SP/flank discomfort  Musculoskeletal: no joint swelling, ROM intact  Extremities: no edema  Neurological: non-focal  Skin: warm and dry  Psych: calm, stable mood     MEDS:  Scheduled meds  albuterol, 2.5 mg, nebulization, TID  atorvastatin, 80 mg, oral, Nightly  clopidogrel, 75 mg, oral, Daily  enoxaparin, 40 mg, subcutaneous, q24h  ezetimibe, 10 mg, oral, Daily  glimepiride, 1 mg, oral, Daily  lisinopril 20 mg, hydroCHLOROthiazide 12.5 mg for Zestoretic/Prinizide, , oral, Daily  metoprolol succinate XL, 25 mg, oral, Daily  polyethylene glycol, 17 g, oral, Daily        Continuous meds       PRN meds  PRN medications: acetaminophen **OR** acetaminophen **OR** acetaminophen, albuterol, meclizine, melatonin, ondansetron **OR** ondansetron      ASSESSMENT/PLAN:  Brendan Rob is a 73 y.o. male with PMHx of DM II, CKD III, HTN, Afib-not on AC, SVT, COPD, PVD who is admitted to observation for generalized weakness and dizziness. Pt stated both the dizziness and weakness started yesterday AM prompting him to visit the ED. Since arrival the dizziness has improved significantly but he still feels a \"room-spinning\" sensation from time to time. He still feels week. Denies any pain, recent illness, " N/V/C/D.   ED course: BP with intermittent HTN otherwise VSS.   Labs largely unremarkable   Lipase 89, HST 9, 10,   TSH 1.12   UA negative   CXR - negative for acute process within limitation of rotation   CTA head and neck - concern for saccular aneurysm projecting superiorly from the M1 segment right middle cerebral artery measuring 2.6 x 2.3 x 2.9 and is directed superiorly     Generalized weakness  Dizziness  - CTA head and neck with concern for aneurysm (incidental finding) neurosurgery to discuss case in cerebrovascular conference and will reach out to patient regarding follow up if needed.   - MRI brain w and wo - no evidence of acute infarct, intracranial mass effect, midline shift, or abnormal intracranial enhancement  - UA and UDS negative   - TSH WNL   - Pt follows with Dr. Smith (neurologist) OP  - PT/OT recommending mod   - Meclizine PRN     DM II  - Blood sugar stable  - Continue home glimepiride     HTN  - Well controlled on home meds      Afib  CAD  - Not on AC   - Continue home meds (metoprolol held 10/25/24 for HR 56)  - 10/25 - notified by nursing that overnight staff reported HR looked irregular at time. Pt with bedside monitor so tele not able to be reviewed. Repeated EKG and discussed with Dr. Quiroz - sinus rhythm with sinus arrhythmia. No need for Cardio input at this time. Can consider holter on discharge.   - Pt follows with Dr. Sánchez and has appt scheduled for 11/1/24    Other comorbidities as above  - Continue medications as ordered and adjust based on clinical course     VTE / GI prophylaxis   - Subcutaneous Lovenox, PPI, bowel regimen in place     Discharge planning  - SNF, pending auth   - Consider holter on discharge     Discussed with Dr. Quiroz and the interdisciplinary team     Rochelle Cornelius PA-C

## 2024-10-25 NOTE — CARE PLAN
The patient's goals for the shift include  sleep comfortable    The clinical goals for the shift include remain free from fall/inury throughout the shift

## 2024-10-25 NOTE — PROGRESS NOTES
Unable to reach patient to discuss discharge and obtain facility choices. Call placed to spouse she stated she had been having difficulty reaching patient as well and wanted a clinical update, tcc made aware. She is very much interested in snf for therapy after discharge. She would like referrals to [facility]. Requested referrals to be sent.  Addendum: Finally able to make contact with patient and discuss discharge he is in agreement with plan. Spouse also was able to reach him as well. Spouse awaiting call call from NP.

## 2024-10-25 NOTE — PROGRESS NOTES
Physical Therapy    Physical Therapy Treatment    Patient Name: Brendan Rob  MRN: [id number]  Department: [place] [address]  Room: [address] [place] [address]  Today's Date: 10/25/2024  Time Calculation  Start Time: 1301  Stop Time: 1311  Time Calculation (min): 10 min         Assessment/Plan   PT Assessment  End of Session Communication: Bedside nurse  Assessment Comment: Pt completed gait training, transfer training at today's visit. Demonstrating increased dizziness with change in positions and movement.  End of Session Patient Position: Bed, 3 rail up, Alarm on  PT Plan  Inpatient/Swing Bed or Outpatient: Inpatient  PT Plan  Treatment/Interventions: Bed mobility, Transfer training, Gait training  PT Plan: Ongoing PT  PT Frequency: 3 times per week  PT Discharge Recommendations: Moderate intensity level of continued care  Equipment Recommended upon Discharge: Wheeled walker  PT Recommended Transfer Status: Assist x1, Assistive device  PT - OK to Discharge: Yes (per POC)      General Visit Information:   PT  Visit  PT Received On: 10/25/24  General  Reason for Referral: 74 y/o M presenting with generalized weakness and dizziness. Pt discoverd to have MCA aneurysm  Referred By: Adryan,  Family/Caregiver Present: No  Prior to Session Communication: Bedside nurse  Patient Position Received: Bed, 3 rail up, Alarm on  Preferred Learning Style: auditory, kinesthetic, visual  General Comment: Pt supine in bed upon arrival Agreeable to tx.    Subjective   Precautions:  Precautions  Medical Precautions: Fall precautions    Vital Signs (Past 2hrs)        Date/Time Vitals Session Patient Position Pulse Resp SpO2 BP MAP (mmHg)    10/25/24 1301 --  --  74  17  --  --  --                   Vital Signs Comment: Pt reporting diziness throughout session that slightly improves with laying down.     Objective   Pain:  Pain Assessment  Pain Assessment: 0-10  0-10 (Numeric) Pain Score: 0 - No pain  Cognition:  Cognition  Orientation Level: Oriented  X4  Coordination:     Postural Control:  Static Sitting Balance  Static Sitting-Balance Support: Bilateral upper extremity supported, Feet supported  Static Sitting-Level of Assistance: Close supervision  Static Sitting-Comment/Number of Minutes: 2  Extremity/Trunk Assessments:  Activity Tolerance:     Treatments:            Bed Mobility  Bed Mobility: Yes  Bed Mobility 1  Bed Mobility 1: Supine to sitting, Sitting to supine  Level of Assistance 1: Close supervision, Contact guard  Bed Mobility Comments 1: Pt performed with HOB elevated and cues given for proper sequencing. Intermittent CGA given due to patient reports of dizziness    Ambulation/Gait Training  Ambulation/Gait Training Performed: Yes  Ambulation/Gait Training 1  Surface 1: Level tile  Device 1: Rolling walker  Assistance 1: Contact guard, Minimum assistance  Quality of Gait 1: Narrow base of support, Forward flexed posture  Comments/Distance (ft) 1: Pt ambulated 15ftx2 with cues given for upright posture and forward gaze. Declines further ambulation or mobility due to dizziness  Transfer 1  Technique 1: Sit to stand, Stand to sit  Transfer Device 1: Walker  Transfer Level of Assistance 1: Minimum assistance  Trials/Comments 1: Pt performed with cues given for hand placement before sitting and standing.    Outcome Measures:  [facility] Basic Mobility  Turning from your back to your side while in a flat bed without using bedrails: A little  Moving from lying on your back to sitting on the side of a flat bed without using bedrails: A little  Moving to and from bed to chair (including a wheelchair): A little  Standing up from a chair using your arms (e.g. wheelchair or bedside chair): A little  To walk in hospital room: A little  Climbing 3-5 steps with railing: Total  Basic Mobility - Total Score: 16    Education Documentation  Body Mechanics, taught by Deneen Stokes PTA at 10/25/2024  1:44 PM.  Learner: Patient  Readiness: Acceptance  Method:  Explanation  Response: Verbalizes Understanding, Needs Reinforcement    Mobility Training, taught by Deneen Stokes PTA at 10/25/2024  1:44 PM.  Learner: Patient  Readiness: Acceptance  Method: Explanation  Response: Verbalizes Understanding, Needs Reinforcement    Education Comments  No comments found.        OP EDUCATION:  Outpatient Education  Education Comment: educated on importance of OOB activity    Encounter Problems       Encounter Problems (Active)       Balance       Goal 1 (Progressing)       Start:  10/24/24    Expected End:  11/07/24       Pt performs all sitting balance IND and standing balance with close supervision using FWW            Mobility       STG - Patient will ambulate (Progressing)       Start:  10/24/24    Expected End:  11/07/24       50 ft with close supervision using FWW            PT Transfers       STG - Patient to transfer to and from sit to supine (Progressing)       Start:  10/24/24    Expected End:  11/07/24       IND         STG - Patient will transfer sit to and from stand (Progressing)       Start:  10/24/24    Expected End:  11/07/24       Mod IND using FWW            Pain - Adult

## 2024-10-25 NOTE — PROGRESS NOTES
Spoke again with spouse reviewed with her the facilities that are able to accept. She has selected [facility]   and we have notified them that they are FOC and we have requested auth to be started.

## 2024-10-26 VITALS
TEMPERATURE: 97.5 F | DIASTOLIC BLOOD PRESSURE: 61 MMHG | HEIGHT: 72 IN | HEART RATE: 81 BPM | OXYGEN SATURATION: 96 % | WEIGHT: 210 LBS | SYSTOLIC BLOOD PRESSURE: 116 MMHG | RESPIRATION RATE: 17 BRPM | BODY MASS INDEX: 28.44 KG/M2

## 2024-10-26 LAB
ANION GAP SERPL CALC-SCNC: 13 MMOL/L (ref 10–20)
BUN SERPL-MCNC: 26 MG/DL (ref 6–23)
CALCIUM SERPL-MCNC: 8.2 MG/DL (ref 8.6–10.3)
CHLORIDE SERPL-SCNC: 106 MMOL/L (ref 98–107)
CO2 SERPL-SCNC: 22 MMOL/L (ref 21–32)
CREAT SERPL-MCNC: 1.27 MG/DL (ref 0.5–1.3)
EGFRCR SERPLBLD CKD-EPI 2021: 60 ML/MIN/1.73M*2
ERYTHROCYTE [DISTWIDTH] IN BLOOD BY AUTOMATED COUNT: 15 % (ref 11.5–14.5)
GLUCOSE SERPL-MCNC: 91 MG/DL (ref 74–99)
HCT VFR BLD AUTO: 40 % (ref 41–52)
HGB BLD-MCNC: 13 G/DL (ref 13.5–17.5)
MCH RBC QN AUTO: 28.9 PG (ref 26–34)
MCHC RBC AUTO-ENTMCNC: 32.5 G/DL (ref 32–36)
MCV RBC AUTO: 89 FL (ref 80–100)
NRBC BLD-RTO: 0 /100 WBCS (ref 0–0)
PLATELET # BLD AUTO: 278 X10*3/UL (ref 150–450)
POTASSIUM SERPL-SCNC: 4.1 MMOL/L (ref 3.5–5.3)
RBC # BLD AUTO: 4.5 X10*6/UL (ref 4.5–5.9)
SODIUM SERPL-SCNC: 137 MMOL/L (ref 136–145)
WBC # BLD AUTO: 7.5 X10*3/UL (ref 4.4–11.3)

## 2024-10-26 PROCEDURE — 80048 BASIC METABOLIC PNL TOTAL CA: CPT

## 2024-10-26 PROCEDURE — 2500000001 HC RX 250 WO HCPCS SELF ADMINISTERED DRUGS (ALT 637 FOR MEDICARE OP)

## 2024-10-26 PROCEDURE — 85027 COMPLETE CBC AUTOMATED: CPT

## 2024-10-26 PROCEDURE — 94760 N-INVAS EAR/PLS OXIMETRY 1: CPT

## 2024-10-26 PROCEDURE — 2500000002 HC RX 250 W HCPCS SELF ADMINISTERED DRUGS (ALT 637 FOR MEDICARE OP, ALT 636 FOR OP/ED)

## 2024-10-26 PROCEDURE — 94640 AIRWAY INHALATION TREATMENT: CPT

## 2024-10-26 PROCEDURE — 36415 COLL VENOUS BLD VENIPUNCTURE: CPT

## 2024-10-26 PROCEDURE — 2500000002 HC RX 250 W HCPCS SELF ADMINISTERED DRUGS (ALT 637 FOR MEDICARE OP, ALT 636 FOR OP/ED): Mod: MUE | Performed by: INTERNAL MEDICINE

## 2024-10-26 PROCEDURE — G0378 HOSPITAL OBSERVATION PER HR: HCPCS

## 2024-10-26 RX ORDER — MECLIZINE HYDROCHLORIDE 25 MG/1
25 TABLET ORAL 3 TIMES DAILY PRN
Qty: 30 TABLET | Refills: 0 | Status: SHIPPED | OUTPATIENT
Start: 2024-10-26

## 2024-10-26 ASSESSMENT — COGNITIVE AND FUNCTIONAL STATUS - GENERAL
DAILY ACTIVITIY SCORE: 24
MOBILITY SCORE: 22
WALKING IN HOSPITAL ROOM: A LITTLE
CLIMB 3 TO 5 STEPS WITH RAILING: A LITTLE

## 2024-10-26 ASSESSMENT — PAIN SCALES - GENERAL: PAINLEVEL_OUTOF10: 0 - NO PAIN

## 2024-10-26 ASSESSMENT — PAIN - FUNCTIONAL ASSESSMENT: PAIN_FUNCTIONAL_ASSESSMENT: 0-10

## 2024-10-26 NOTE — NURSING NOTE
IV removed without complications. Telemetry monitor removed. Pt discharged to [facility] via [facility].

## 2024-10-26 NOTE — PROGRESS NOTES
10/26/24 0719   Discharge Planning   Expected Discharge Disposition SNF     Per notes in [facility] FOC, auth was received will exp on 10/29, I did reach out to the facility to verify they could accept today if patient was med ready I will continue to monitor for discharge planning.    10:37 Patient discharging to [facility], facility is aware transport has been set up for 12:30p report # has been given to bedside RN, I will continue to monitor for discharge planning.

## 2024-10-26 NOTE — DISCHARGE SUMMARY
Discharge Diagnosis  Dizziness    Issues Requiring Follow-Up  Dizziness     Discharge Meds     Medication List      START taking these medications     meclizine 25 mg tablet; Commonly known as: Antivert; Take 1 tablet (25   mg) by mouth 3 times a day as needed for dizziness.     CONTINUE taking these medications     albuterol 90 mcg/actuation inhaler; USE 2 INHALATIONS BY MOUTH EVERY 6   HOURS AS NEEDED FOR WHEEZING   atorvastatin 80 mg tablet; Commonly known as: Lipitor; TAKE 1 TABLET BY   MOUTH ONCE  DAILY AT BEDTIME   Blood Glucose Test strip; Generic drug: blood sugar diagnostic; USE AS   DIRECTED.   clopidogrel 75 mg tablet; Commonly known as: Plavix; TAKE 1 TABLET BY   MOUTH ONCE  DAILY   ezetimibe 10 mg tablet; Commonly known as: Zetia; Take 1 tablet (10 mg)   by mouth once daily.   glimepiride 1 mg tablet; Commonly known as: Amaryl; TAKE 1 TABLET BY   MOUTH ONCE  DAILY   lisinopriL-hydrochlorothiazide 20-12.5 mg tablet; TAKE 1 TABLET BY MOUTH   ONCE  DAILY   metoprolol succinate XL 25 mg 24 hr tablet; Commonly known as:   Toprol-XL; TAKE 1 TABLET BY MOUTH ONCE  DAILY DO NOT CRUSH OR CHEW       Test Results Pending At Discharge  Pending Labs       Order Current Status    Extra Urine Gray Tube Collected (10/23/24 1441)    Urinalysis with Reflex Culture and Microscopic In process            Hospital Course     Brendan Rob is a 73 y.o. male with PMHx of DM II, CKD III, HTN, Afib-not on AC, SVT, COPD, PVD who is admitted to observation for generalized weakness and dizziness. Hospital course: BP with intermittent HTN otherwise VSS.   Labs largely unremarkable. Lipase 89, HST 9, 10. TSH 1.12. UA negative   - CXR: negative   - CTA head and neck - concern for saccular aneurysm projecting superiorly from the M1 segment right middle cerebral artery measuring 2.6 x 2.3 x 2.9 and is directed superiorly   - MRI brain w and wo - no evidence of acute infarct, intracranial mass effect, midline shift, or abnormal  intracranial enhancement  - Pt follows with Dr. Smith (neurologist) OP  - PT/OT recommending moderate intensity therapy  - Continue with Meclizine PRN  Afib   CAD  - Not on AC   - Continue home meds (metoprolol )  - 10/25 EKG -sinus rhythm with sinus arrhythmia.    - Pt follows with Dr. Sánchez and has appt scheduled for 11/1/24  - Pt discharging to SNF over weekend ( to discuss at Cardiology appointment above if Holter monitor is necessary)     Discharge disposition: SNF    Patient seen at bedside. Events from the last visit reviewed. Discussed with staff. Results of tests and investigations from last visit reviewed and discussed with patient/Family. Electronic chart on [facility] reviewed. Input / Recommendations  from consultants appreciated and reviewed and agreed with.    Discharge summary and profile completed. Medications reviewed and discussed with patient and family.  Prescriptions completed and signed     Total discharge time in excess of 30 minutes.    Interdisciplinary rounding completed with Attending Provider, Bedside RN and TCC.  Labs, results and plan of care and discharge plan discussed and reviewed with Dr. Quiroz    Pertinent Physical Exam At Time of Discharge  Physical Exam  Constitutional: NAD, alert and cooperative  Eyes: no icterus  ENMT: mucous membranes moist, no lesions  Head/Neck: supple  Respiratory/Thorax: CTA bilaterally, non-labored breathing, no cough, on RA  Cardiovascular: RRR, no murmurs heard  Gastrointestinal: ND/S/NT  : no Eagle, no SP/flank discomfort  Musculoskeletal: no joint swelling, ROM intact  Extremities: no edema  Neurological: non-focal  Skin: warm and dry  Psych: calm, stable mood   Outpatient Follow-Up  Future Appointments   Date Time Provider Department [place]   11/1/2024  9:45 AM Xavier NORWOOD MD [id number] [facility]   11/11/2024  9:30 AM Shelia Jorgensen MD [id number] [facility]   1/9/2025  1:30 PM Devan Smith MD PhD [id number] [facility]   1/21/2025 10:40 AM Anna MONAHAN  DO Sumit [id number] Frans[facility] Muñiz, APRN-CNP

## 2024-10-26 NOTE — CARE PLAN
The patient's goals for the shift include      The clinical goals for the shift include pt to report a decrease in dizziness          Problem: Pain - Adult  Goal: Verbalizes/displays adequate comfort level or baseline comfort level  Outcome: Progressing     Problem: Safety - Adult  Goal: Free from fall injury  Outcome: Progressing     Problem: Discharge Planning  Goal: Discharge to home or other facility with appropriate resources  Outcome: Progressing     Problem: Chronic Conditions and Co-morbidities  Goal: Patient's chronic conditions and co-morbidity symptoms are monitored and maintained or improved  Outcome: Progressing     Problem: Fall/Injury  Goal: Not fall by end of shift  Outcome: Progressing  Goal: Be free from injury by end of the shift  Outcome: Progressing  Goal: Use assistive devices by end of the shift  Outcome: Progressing  Goal: Pace activities to prevent fatigue by end of the shift  Outcome: Progressing

## 2024-10-27 LAB
ATRIAL RATE: 62 BPM
ATRIAL RATE: 64 BPM
P AXIS: -13 DEGREES
P AXIS: 54 DEGREES
P OFFSET: 185 MS
P ONSET: 133 MS
PR INTERVAL: 170 MS
PR INTERVAL: 176 MS
Q ONSET: 218 MS
Q ONSET: 221 MS
QRS COUNT: 11 BEATS
QRS COUNT: 9 BEATS
QRS DURATION: 88 MS
QRS DURATION: 94 MS
QT INTERVAL: 394 MS
QT INTERVAL: 402 MS
QTC CALCULATION(BAZETT): 406 MS
QTC CALCULATION(BAZETT): 408 MS
QTC FREDERICIA: 402 MS
QTC FREDERICIA: 406 MS
R AXIS: -13 DEGREES
R AXIS: 54 DEGREES
T AXIS: -18 DEGREES
T AXIS: 62 DEGREES
T OFFSET: 418 MS
T OFFSET: 419 MS
VENTRICULAR RATE: 62 BPM
VENTRICULAR RATE: 64 BPM

## 2024-10-28 ENCOUNTER — NURSING HOME VISIT (OUTPATIENT)
Dept: POST ACUTE CARE | Facility: EXTERNAL LOCATION | Age: 74
End: 2024-10-28
Payer: MEDICARE

## 2024-10-28 DIAGNOSIS — I48.91 ATRIAL FIBRILLATION, UNSPECIFIED TYPE (MULTI): ICD-10-CM

## 2024-10-28 DIAGNOSIS — E78.5 HYPERLIPIDEMIA, UNSPECIFIED HYPERLIPIDEMIA TYPE: ICD-10-CM

## 2024-10-28 DIAGNOSIS — N18.31 STAGE 3A CHRONIC KIDNEY DISEASE (MULTI): ICD-10-CM

## 2024-10-28 DIAGNOSIS — R42 DIZZINESS: ICD-10-CM

## 2024-10-28 DIAGNOSIS — I10 BENIGN ESSENTIAL HYPERTENSION: Primary | ICD-10-CM

## 2024-10-28 DIAGNOSIS — J43.9 PULMONARY EMPHYSEMA, UNSPECIFIED EMPHYSEMA TYPE (MULTI): ICD-10-CM

## 2024-10-28 PROBLEM — I48.0 PAROXYSMAL ATRIAL FIBRILLATION (MULTI): Status: RESOLVED | Noted: 2023-04-12 | Resolved: 2024-10-28

## 2024-10-28 PROCEDURE — 99305 1ST NF CARE MODERATE MDM 35: CPT | Performed by: INTERNAL MEDICINE

## 2024-10-30 ENCOUNTER — MULTIDISCIPLINARY MEETING (OUTPATIENT)
Dept: NEUROSURGERY | Facility: HOSPITAL | Age: 74
End: 2024-10-30
Payer: MEDICARE

## 2024-10-31 ENCOUNTER — NURSING HOME VISIT (OUTPATIENT)
Dept: POST ACUTE CARE | Facility: EXTERNAL LOCATION | Age: 74
End: 2024-10-31
Payer: MEDICARE

## 2024-10-31 DIAGNOSIS — N18.31 STAGE 3A CHRONIC KIDNEY DISEASE (MULTI): ICD-10-CM

## 2024-10-31 DIAGNOSIS — I10 BENIGN ESSENTIAL HYPERTENSION: ICD-10-CM

## 2024-10-31 DIAGNOSIS — R53.1 WEAKNESS: ICD-10-CM

## 2024-10-31 DIAGNOSIS — I48.91 ATRIAL FIBRILLATION, UNSPECIFIED TYPE (MULTI): ICD-10-CM

## 2024-10-31 DIAGNOSIS — J43.9 PULMONARY EMPHYSEMA, UNSPECIFIED EMPHYSEMA TYPE (MULTI): Primary | ICD-10-CM

## 2024-10-31 LAB
ATRIAL RATE: 60 BPM
P AXIS: 91 DEGREES
P OFFSET: 178 MS
P ONSET: 129 MS
PR INTERVAL: 176 MS
Q ONSET: 217 MS
QRS COUNT: 10 BEATS
QRS DURATION: 88 MS
QT INTERVAL: 404 MS
QTC CALCULATION(BAZETT): 404 MS
QTC FREDERICIA: 404 MS
R AXIS: 39 DEGREES
T AXIS: 63 DEGREES
T OFFSET: 419 MS
VENTRICULAR RATE: 60 BPM

## 2024-10-31 PROCEDURE — 99309 SBSQ NF CARE MODERATE MDM 30: CPT | Performed by: INTERNAL MEDICINE

## 2024-11-01 ENCOUNTER — APPOINTMENT (OUTPATIENT)
Dept: CARDIOLOGY | Facility: CLINIC | Age: 74
End: 2024-11-01
Payer: MEDICARE

## 2024-11-03 DIAGNOSIS — I47.19 ATRIAL TACHYCARDIA (CMS-HCC): ICD-10-CM

## 2024-11-04 ENCOUNTER — NURSING HOME VISIT (OUTPATIENT)
Dept: POST ACUTE CARE | Facility: EXTERNAL LOCATION | Age: 74
End: 2024-11-04
Payer: MEDICARE

## 2024-11-04 DIAGNOSIS — R53.1 WEAKNESS: ICD-10-CM

## 2024-11-04 DIAGNOSIS — I10 BENIGN ESSENTIAL HYPERTENSION: ICD-10-CM

## 2024-11-04 DIAGNOSIS — J43.9 PULMONARY EMPHYSEMA, UNSPECIFIED EMPHYSEMA TYPE (MULTI): Primary | ICD-10-CM

## 2024-11-04 DIAGNOSIS — I48.91 ATRIAL FIBRILLATION, UNSPECIFIED TYPE (MULTI): ICD-10-CM

## 2024-11-04 PROCEDURE — 99308 SBSQ NF CARE LOW MDM 20: CPT | Performed by: INTERNAL MEDICINE

## 2024-11-04 RX ORDER — CLOPIDOGREL BISULFATE 75 MG/1
75 TABLET ORAL DAILY
Qty: 90 TABLET | Refills: 0 | Status: SHIPPED | OUTPATIENT
Start: 2024-11-04

## 2024-11-04 NOTE — LETTER
Patient: Brendan Rob  : 1950    Encounter Date: 2024    PROGRESS NOTE    Subjective  Chief complaint: Brendan Rob is a 73 y.o. male who is an acute skilled patient being seen and evaluated for weakness    HPI:  HPI  Therapy has been working with the patient to improve strength and endurance with ADLs, transfers, and mobility.  Patient continues to work toward goals.  Patient is stable and has no new complaints.  Nursing staff voices no new concerns today.  Patient was seen and examined at the bedside, appears to be in no acute distress.   \  Objective  Vital signs: 130/76, 97.5, 85, 18, 95%    Physical Exam  Constitutional:       General: He is not in acute distress.  Eyes:      Extraocular Movements: Extraocular movements intact.   Cardiovascular:      Rate and Rhythm: Normal rate and regular rhythm.   Pulmonary:      Effort: Pulmonary effort is normal.      Breath sounds: Normal breath sounds.   Abdominal:      General: Bowel sounds are normal.      Palpations: Abdomen is soft.   Musculoskeletal:      Cervical back: Neck supple.      Right lower leg: No edema.      Left lower leg: No edema.   Neurological:      Mental Status: He is alert.      Motor: Weakness present.   Psychiatric:         Mood and Affect: Mood normal.         Behavior: Behavior is cooperative.         Assessment/Plan  Problem List Items Addressed This Visit       Benign essential hypertension     Monitor blood pressure  YANELY diet  Lisinopril-hydrochlorothiazide  Metoprolol         COPD (chronic obstructive pulmonary disease) (Multi) - Primary     Monitor for shortness of breath or wheezing.  Inhaler  On room air         Atrial fibrillation (Multi)     Clopidogrel  Bleeding precautions  Metoprolol         Weakness     Work in therapy towards goals          Medications, treatments, and labs reviewed  Continue medications and treatments as listed in EMR      Scribe Attestation  I, Marty Jennings   attest that this  documentation has been prepared under the direction and in the presence of Stanley Miranda MD    Provider Attestation - Scribe documentation  All medical record entries made by the Scribe were at my direction and personally dictated by me. I have reviewed the chart and agree that the record accurately reflects my personal performance of the history, physical exam, discussion and plan.   Stanley Miranda MD        Electronically Signed By: Stanley Miranda MD   11/5/24 11:43 AM

## 2024-11-04 NOTE — PROGRESS NOTES
PROGRESS NOTE    Subjective   Chief complaint: Brendan Rob is a 73 y.o. male who is an acute skilled patient being seen and evaluated for weakness    HPI:  HPI  Therapy has been working with the patient to improve strength and endurance with ADLs, transfers, and mobility.  Patient continues to work toward goals.  Patient is stable and has no new complaints.  Nursing staff voices no new concerns today.  Patient was seen and examined at the bedside, appears to be in no acute distress.   \  Objective   Vital signs: 130/76, 97.5, 85, 18, 95%    Physical Exam  Constitutional:       General: He is not in acute distress.  Eyes:      Extraocular Movements: Extraocular movements intact.   Cardiovascular:      Rate and Rhythm: Normal rate and regular rhythm.   Pulmonary:      Effort: Pulmonary effort is normal.      Breath sounds: Normal breath sounds.   Abdominal:      General: Bowel sounds are normal.      Palpations: Abdomen is soft.   Musculoskeletal:      Cervical back: Neck supple.      Right lower leg: No edema.      Left lower leg: No edema.   Neurological:      Mental Status: He is alert.      Motor: Weakness present.   Psychiatric:         Mood and Affect: Mood normal.         Behavior: Behavior is cooperative.         Assessment/Plan   Problem List Items Addressed This Visit       Benign essential hypertension     Monitor blood pressure  YNAELY diet  Lisinopril-hydrochlorothiazide  Metoprolol         COPD (chronic obstructive pulmonary disease) (Multi) - Primary     Monitor for shortness of breath or wheezing.  Inhaler  On room air         Atrial fibrillation (Multi)     Clopidogrel  Bleeding precautions  Metoprolol         Weakness     Work in therapy towards goals          Medications, treatments, and labs reviewed  Continue medications and treatments as listed in EMR      Pauletteibe Attestation  Jennifer ROWLAND Scribe   attest that this documentation has been prepared under the direction and in the presence of  Stanley Miranda MD    Provider Attestation - Scribe documentation  All medical record entries made by the Scribe were at my direction and personally dictated by me. I have reviewed the chart and agree that the record accurately reflects my personal performance of the history, physical exam, discussion and plan.   Stanley Miranda MD

## 2024-11-05 ENCOUNTER — NURSING HOME VISIT (OUTPATIENT)
Dept: POST ACUTE CARE | Facility: EXTERNAL LOCATION | Age: 74
End: 2024-11-05
Payer: MEDICARE

## 2024-11-05 DIAGNOSIS — R53.1 WEAKNESS: Primary | ICD-10-CM

## 2024-11-05 DIAGNOSIS — I48.91 ATRIAL FIBRILLATION, UNSPECIFIED TYPE (MULTI): ICD-10-CM

## 2024-11-05 DIAGNOSIS — J43.9 PULMONARY EMPHYSEMA, UNSPECIFIED EMPHYSEMA TYPE (MULTI): ICD-10-CM

## 2024-11-05 DIAGNOSIS — I72.9 ANEURYSM (CMS-HCC): ICD-10-CM

## 2024-11-05 DIAGNOSIS — E78.49 OTHER HYPERLIPIDEMIA: ICD-10-CM

## 2024-11-05 DIAGNOSIS — G31.84 MILD COGNITIVE IMPAIRMENT: ICD-10-CM

## 2024-11-05 PROCEDURE — 99309 SBSQ NF CARE MODERATE MDM 30: CPT | Performed by: NURSE PRACTITIONER

## 2024-11-05 NOTE — LETTER
Patient: Brendan Rob  : 1950    Encounter Date: 2024    PROGRESS NOTE    Subjective  Chief complaint: Brendan Rob is a 73 y.o. male who is an acute skilled patient being seen and evaluated for weakness    HPI:  HPI  Remains in therapy.  He reports dizziness with movement.  Reports that Antivert helps.    Will check orthostatic blood pressure.  Nursing reports good appetite. Denies any gastric distress.   Therapy reports poor endurance.   Denies any discomfort or pain.       Objective  Vital signs: 122/43-97.7 - 91-18    Physical Exam  Constitutional:       General: He is not in acute distress.  Eyes:      Extraocular Movements: Extraocular movements intact.   Cardiovascular:      Rate and Rhythm: Normal rate and regular rhythm.   Pulmonary:      Effort: Pulmonary effort is normal.      Breath sounds: Normal breath sounds.      Comments: Lungs clear   Abdominal:      General: Bowel sounds are normal.      Palpations: Abdomen is soft.   Musculoskeletal:      Cervical back: Neck supple.      Right lower leg: No edema.      Left lower leg: No edema.   Neurological:      Mental Status: He is alert.      Motor: Weakness present.   Psychiatric:         Mood and Affect: Mood normal.         Assessment/Plan  Problem List Items Addressed This Visit       Mild cognitive impairment     Monitor safety awareness         COPD (chronic obstructive pulmonary disease) (Multi)     Monitor for shortness of breath or wheezing.  Inhaler  On room air         HLD (hyperlipidemia)     Statin  Ezetimibe  Monitor labs         Atrial fibrillation (Multi)     Clopidogrel  Bleeding precautions  Metoprolol         Weakness - Primary     Continue therapy, work towards goals  Monitor progress and endurance           Medications, treatments, and labs reviewed  Continue medications and treatments as listed in EMR      Marty AlvesestDavid De Scribe attest that this documentation has been prepared under the  direction and in the presence of CASS Read    Provider Attestation - Scribe documentation  All medical record entries made by the Scribe were at my direction and personally dictated by me. I have reviewed the chart and agree that the record accurately reflects my personal performance of the history, physical exam, discussion and plan.   CASS Read        Electronically Signed By: CASS Read   11/11/24  7:48 PM

## 2024-11-05 NOTE — PROGRESS NOTES
Per Cerebral Vascular  Conference on 10/30/2024 - It is recommend that Mr. Brendan Rob be seen by Dr. Red Tariq in outpatient clinic for evaluation of incidentally found 2.6 mm right MCA aneurysm.

## 2024-11-06 ENCOUNTER — NURSING HOME VISIT (OUTPATIENT)
Dept: POST ACUTE CARE | Facility: EXTERNAL LOCATION | Age: 74
End: 2024-11-06
Payer: MEDICARE

## 2024-11-06 DIAGNOSIS — N18.31 STAGE 3A CHRONIC KIDNEY DISEASE (MULTI): ICD-10-CM

## 2024-11-06 DIAGNOSIS — E78.49 OTHER HYPERLIPIDEMIA: ICD-10-CM

## 2024-11-06 DIAGNOSIS — I10 BENIGN ESSENTIAL HYPERTENSION: ICD-10-CM

## 2024-11-06 DIAGNOSIS — G31.84 MILD COGNITIVE IMPAIRMENT: ICD-10-CM

## 2024-11-06 DIAGNOSIS — I48.91 ATRIAL FIBRILLATION, UNSPECIFIED TYPE (MULTI): ICD-10-CM

## 2024-11-06 DIAGNOSIS — R53.1 WEAKNESS: Primary | ICD-10-CM

## 2024-11-06 DIAGNOSIS — J43.8 OTHER EMPHYSEMA (MULTI): ICD-10-CM

## 2024-11-06 PROCEDURE — 99309 SBSQ NF CARE MODERATE MDM 30: CPT | Performed by: NURSE PRACTITIONER

## 2024-11-06 NOTE — LETTER
Patient: Brendan Rob  : 1950    Encounter Date: 2024    PROGRESS NOTE    Subjective  Chief complaint: Brendan Rob is a 73 y.o. male who is an acute skilled patient being seen and evaluated for weakness    HPI:  HPI Remains in therapy.  Therapy reports that she is participating, but has poor endurance.  Just finished lunch,  has good appetite.  Denies any gastric distress. Denies any pain or discomfort.  Requires extensive assistance from staff for all HOC and mobility.       Objective  Vital signs: 125/69-97.8 - 70-18    Physical Exam  Constitutional:       General: He is not in acute distress.  Eyes:      Extraocular Movements: Extraocular movements intact.   Cardiovascular:      Rate and Rhythm: Normal rate and regular rhythm.   Pulmonary:      Effort: Pulmonary effort is normal.      Breath sounds: Normal breath sounds.      Comments: Lungs clear   Abdominal:      General: Bowel sounds are normal.      Palpations: Abdomen is soft.   Musculoskeletal:      Cervical back: Neck supple.      Right lower leg: No edema.      Left lower leg: No edema.   Neurological:      Mental Status: He is alert.      Motor: Weakness present.   Psychiatric:         Mood and Affect: Mood normal.         Assessment/Plan  Problem List Items Addressed This Visit       Benign essential hypertension     Monitor blood pressure  YANELY diet  Lisinopril-hydrochlorothiazide  Metoprolol         Mild cognitive impairment     Monitor safety awareness         COPD (chronic obstructive pulmonary disease) (Multi)     Monitor for shortness of breath or wheezing.  Inhaler  On room air         Stage 3a chronic kidney disease (Multi)     Monitor labs  Avoid nephrotoxins         HLD (hyperlipidemia)     Statin  Ezetimibe  Monitor labs         Atrial fibrillation (Multi)     Clopidogrel  Bleeding precautions  Metoprolol         Weakness - Primary     Continue therapy, work towards goals  Has poor endurance           Medications,  treatments, and labs reviewed  Continue medications and treatments as listed in EMR      Scribe Attestation  I, Marty John   attest that this documentation has been prepared under the direction and in the presence of CASS Read    Provider Attestation - Scribe documentation  All medical record entries made by the Scribe were at my direction and personally dictated by me. I have reviewed the chart and agree that the record accurately reflects my personal performance of the history, physical exam, discussion and plan.   CASS eRad        Electronically Signed By: CASS Read   11/11/24  8:24 PM

## 2024-11-07 ENCOUNTER — NURSING HOME VISIT (OUTPATIENT)
Dept: POST ACUTE CARE | Facility: EXTERNAL LOCATION | Age: 74
End: 2024-11-07
Payer: MEDICARE

## 2024-11-07 DIAGNOSIS — I10 BENIGN ESSENTIAL HYPERTENSION: ICD-10-CM

## 2024-11-07 DIAGNOSIS — I48.91 ATRIAL FIBRILLATION, UNSPECIFIED TYPE (MULTI): Primary | ICD-10-CM

## 2024-11-07 DIAGNOSIS — R53.1 WEAKNESS: ICD-10-CM

## 2024-11-07 DIAGNOSIS — J43.9 PULMONARY EMPHYSEMA, UNSPECIFIED EMPHYSEMA TYPE (MULTI): ICD-10-CM

## 2024-11-07 PROCEDURE — 99309 SBSQ NF CARE MODERATE MDM 30: CPT | Performed by: INTERNAL MEDICINE

## 2024-11-07 NOTE — PROGRESS NOTES
PROGRESS NOTE    Subjective   Chief complaint: Brendan Rob is a 73 y.o. male who is an acute skilled patient being seen and evaluated for weakness    HPI:  HPI  Patient completing multiple therapeutic exercises to increase strength, mobility and flexibility.  Patient actively participates in skilled interventions.  Patient is seen and examined at the bedside, appears to be in no acute distress.  Denies chest pain, shortness of breath, nausea vomiting fever chills.  Nursing staff voicing no new concerns.    Objective   Vital signs: 124/70, 97.8, 71, 18, 96%    Physical Exam  Constitutional:       General: He is not in acute distress.  Eyes:      Extraocular Movements: Extraocular movements intact.   Cardiovascular:      Rate and Rhythm: Normal rate and regular rhythm.   Pulmonary:      Effort: Pulmonary effort is normal.      Breath sounds: Normal breath sounds.   Abdominal:      General: Bowel sounds are normal.      Palpations: Abdomen is soft.   Musculoskeletal:      Cervical back: Neck supple.      Right lower leg: No edema.      Left lower leg: No edema.   Neurological:      Mental Status: He is alert.      Motor: Weakness present.   Psychiatric:         Mood and Affect: Mood normal.         Behavior: Behavior is cooperative.         Assessment/Plan   Problem List Items Addressed This Visit       Benign essential hypertension     Monitor blood pressure  YANELY diet  Lisinopril-hydrochlorothiazide  Metoprolol         COPD (chronic obstructive pulmonary disease) (Multi)     Monitor for shortness of breath or wheezing.  Inhaler  On room air         Atrial fibrillation (Multi) - Primary     Clopidogrel  Bleeding precautions  Metoprolol         Weakness     Continue therapy, work towards goals          Medications, treatments, and labs reviewed  Continue medications and treatments as listed in EMR      Scribe Attestation  KASHIF, Marty Jennings   attest that this documentation has been prepared under the  direction and in the presence of Stanley Miranda MD    Provider Attestation - Scribe documentation  All medical record entries made by the Scribe were at my direction and personally dictated by me. I have reviewed the chart and agree that the record accurately reflects my personal performance of the history, physical exam, discussion and plan.   Stanley Miranda MD

## 2024-11-07 NOTE — LETTER
Patient: Brendan Rob  : 1950    Encounter Date: 2024    PROGRESS NOTE    Subjective  Chief complaint: Brendan Rob is a 73 y.o. male who is an acute skilled patient being seen and evaluated for weakness    HPI:  HPI  Patient completing multiple therapeutic exercises to increase strength, mobility and flexibility.  Patient actively participates in skilled interventions.  Patient is seen and examined at the bedside, appears to be in no acute distress.  Denies chest pain, shortness of breath, nausea vomiting fever chills.  Nursing staff voicing no new concerns.    Objective  Vital signs: 124/70, 97.8, 71, 18, 96%    Physical Exam  Constitutional:       General: He is not in acute distress.  Eyes:      Extraocular Movements: Extraocular movements intact.   Cardiovascular:      Rate and Rhythm: Normal rate and regular rhythm.   Pulmonary:      Effort: Pulmonary effort is normal.      Breath sounds: Normal breath sounds.   Abdominal:      General: Bowel sounds are normal.      Palpations: Abdomen is soft.   Musculoskeletal:      Cervical back: Neck supple.      Right lower leg: No edema.      Left lower leg: No edema.   Neurological:      Mental Status: He is alert.      Motor: Weakness present.   Psychiatric:         Mood and Affect: Mood normal.         Behavior: Behavior is cooperative.         Assessment/Plan  Problem List Items Addressed This Visit       Benign essential hypertension     Monitor blood pressure  YANELY diet  Lisinopril-hydrochlorothiazide  Metoprolol         COPD (chronic obstructive pulmonary disease) (Multi)     Monitor for shortness of breath or wheezing.  Inhaler  On room air         Atrial fibrillation (Multi) - Primary     Clopidogrel  Bleeding precautions  Metoprolol         Weakness     Continue therapy, work towards goals          Medications, treatments, and labs reviewed  Continue medications and treatments as listed in EMR      Scribe Attestation  I, Jennifer Lund,  Scribe   attest that this documentation has been prepared under the direction and in the presence of Stanley Miranda MD    Provider Attestation - Scribe documentation  All medical record entries made by the Scribe were at my direction and personally dictated by me. I have reviewed the chart and agree that the record accurately reflects my personal performance of the history, physical exam, discussion and plan.   Stanley Miranda MD        Electronically Signed By: Stanley Miranda MD   11/8/24 12:12 PM

## 2024-11-11 ENCOUNTER — APPOINTMENT (OUTPATIENT)
Dept: PULMONOLOGY | Facility: CLINIC | Age: 74
End: 2024-11-11
Payer: MEDICARE

## 2024-11-11 NOTE — PROGRESS NOTES
PROGRESS NOTE    Subjective   Chief complaint: Brendan Rob is a 73 y.o. male who is an acute skilled patient being seen and evaluated for weakness    HPI:  HPI  Remains in therapy.  He reports dizziness with movement.  Reports that Antivert helps.    Will check orthostatic blood pressure.  Nursing reports good appetite. Denies any gastric distress.   Therapy reports poor endurance.   Denies any discomfort or pain.       Objective   Vital signs: 122/43-97.7 - 91-18    Physical Exam  Constitutional:       General: He is not in acute distress.  Eyes:      Extraocular Movements: Extraocular movements intact.   Cardiovascular:      Rate and Rhythm: Normal rate and regular rhythm.   Pulmonary:      Effort: Pulmonary effort is normal.      Breath sounds: Normal breath sounds.      Comments: Lungs clear   Abdominal:      General: Bowel sounds are normal.      Palpations: Abdomen is soft.   Musculoskeletal:      Cervical back: Neck supple.      Right lower leg: No edema.      Left lower leg: No edema.   Neurological:      Mental Status: He is alert.      Motor: Weakness present.   Psychiatric:         Mood and Affect: Mood normal.         Assessment/Plan   Problem List Items Addressed This Visit       Mild cognitive impairment     Monitor safety awareness         COPD (chronic obstructive pulmonary disease) (Multi)     Monitor for shortness of breath or wheezing.  Inhaler  On room air         HLD (hyperlipidemia)     Statin  Ezetimibe  Monitor labs         Atrial fibrillation (Multi)     Clopidogrel  Bleeding precautions  Metoprolol         Weakness - Primary     Continue therapy, work towards goals  Monitor progress and endurance           Medications, treatments, and labs reviewed  Continue medications and treatments as listed in EMR      Scribe Attestation  I, Marty John   attest that this documentation has been prepared under the direction and in the presence of DARNELL Read-CHANTAL    Provider  Attestation - Scribe documentation  All medical record entries made by the Scribe were at my direction and personally dictated by me. I have reviewed the chart and agree that the record accurately reflects my personal performance of the history, physical exam, discussion and plan.   Cathy Zhong, APRN-CNP

## 2024-11-11 NOTE — PROGRESS NOTES
PROGRESS NOTE    Subjective   Chief complaint: Brendan Rob is a 73 y.o. male who is an acute skilled patient being seen and evaluated for weakness    HPI:  HPI Remains in therapy.  Therapy reports that she is participating, but has poor endurance.  Just finished lunch,  has good appetite.  Denies any gastric distress. Denies any pain or discomfort.  Requires extensive assistance from staff for all HOC and mobility.       Objective   Vital signs: 125/69-97.8 - 70-18    Physical Exam  Constitutional:       General: He is not in acute distress.  Eyes:      Extraocular Movements: Extraocular movements intact.   Cardiovascular:      Rate and Rhythm: Normal rate and regular rhythm.   Pulmonary:      Effort: Pulmonary effort is normal.      Breath sounds: Normal breath sounds.      Comments: Lungs clear   Abdominal:      General: Bowel sounds are normal.      Palpations: Abdomen is soft.   Musculoskeletal:      Cervical back: Neck supple.      Right lower leg: No edema.      Left lower leg: No edema.   Neurological:      Mental Status: He is alert.      Motor: Weakness present.   Psychiatric:         Mood and Affect: Mood normal.         Assessment/Plan   Problem List Items Addressed This Visit       Benign essential hypertension     Monitor blood pressure  YANELY diet  Lisinopril-hydrochlorothiazide  Metoprolol         Mild cognitive impairment     Monitor safety awareness         COPD (chronic obstructive pulmonary disease) (Multi)     Monitor for shortness of breath or wheezing.  Inhaler  On room air         Stage 3a chronic kidney disease (Multi)     Monitor labs  Avoid nephrotoxins         HLD (hyperlipidemia)     Statin  Ezetimibe  Monitor labs         Atrial fibrillation (Multi)     Clopidogrel  Bleeding precautions  Metoprolol         Weakness - Primary     Continue therapy, work towards goals  Has poor endurance           Medications, treatments, and labs reviewed  Continue medications and treatments as listed  in EMR      Scribe Attestation  I, Marty John   attest that this documentation has been prepared under the direction and in the presence of CASS Read    Provider Attestation - Scribe documentation  All medical record entries made by the Scribe were at my direction and personally dictated by me. I have reviewed the chart and agree that the record accurately reflects my personal performance of the history, physical exam, discussion and plan.   CASS Read

## 2024-11-12 ENCOUNTER — TELEPHONE (OUTPATIENT)
Dept: PRIMARY CARE | Facility: CLINIC | Age: 74
End: 2024-11-12
Payer: MEDICARE

## 2024-11-13 DIAGNOSIS — E78.00 PURE HYPERCHOLESTEROLEMIA: ICD-10-CM

## 2024-11-13 DIAGNOSIS — E78.5 HYPERLIPIDEMIA, UNSPECIFIED HYPERLIPIDEMIA TYPE: ICD-10-CM

## 2024-11-14 RX ORDER — ATORVASTATIN CALCIUM 80 MG/1
80 TABLET, FILM COATED ORAL NIGHTLY
Qty: 90 TABLET | Refills: 0 | Status: SHIPPED | OUTPATIENT
Start: 2024-11-14

## 2024-11-14 RX ORDER — EZETIMIBE 10 MG/1
10 TABLET ORAL DAILY
Qty: 90 TABLET | Refills: 0 | Status: SHIPPED | OUTPATIENT
Start: 2024-11-14

## 2024-11-20 ENCOUNTER — APPOINTMENT (OUTPATIENT)
Dept: PRIMARY CARE | Facility: CLINIC | Age: 74
End: 2024-11-20
Payer: MEDICARE

## 2024-11-20 DIAGNOSIS — R42 DIZZINESS: ICD-10-CM

## 2024-11-20 PROCEDURE — G2211 COMPLEX E/M VISIT ADD ON: HCPCS | Performed by: INTERNAL MEDICINE

## 2024-11-20 PROCEDURE — 3044F HG A1C LEVEL LT 7.0%: CPT | Performed by: INTERNAL MEDICINE

## 2024-11-20 PROCEDURE — 1159F MED LIST DOCD IN RCRD: CPT | Performed by: INTERNAL MEDICINE

## 2024-11-20 PROCEDURE — 3050F LDL-C >= 130 MG/DL: CPT | Performed by: INTERNAL MEDICINE

## 2024-11-20 PROCEDURE — 99214 OFFICE O/P EST MOD 30 MIN: CPT | Performed by: INTERNAL MEDICINE

## 2024-11-20 PROCEDURE — 1160F RVW MEDS BY RX/DR IN RCRD: CPT | Performed by: INTERNAL MEDICINE

## 2024-11-20 RX ORDER — MECLIZINE HYDROCHLORIDE 25 MG/1
25 TABLET ORAL 3 TIMES DAILY PRN
Qty: 90 TABLET | Refills: 1 | Status: SHIPPED | OUTPATIENT
Start: 2024-11-20

## 2024-11-20 NOTE — LETTER
November 20, 2024   Caregiver Name: Marcel Rob   Patient: Brendan Rob     YOB: 1950   Date of Visit: 11/20/2024       To Whom It May Concern:    Brendan Rob was seen in my clinic on 11/20/2024 at 11:20 am. Please excuse his son, Marcel for his absence from work to take care of his father when he was discharged home from the hospital on Nov 8 thru Nov 11, 2024.  Thank you very much.     If you have any questions or concerns, please don't hesitate to call.         Sincerely,   Anna Arana, DO

## 2024-11-20 NOTE — PROGRESS NOTES
Subjective   Patient ID: Brendan Rob is a 73 y.o. male who presents via virtual visit for ER Follow-up.  An interactive audio and video telecommunication system which permits real time communications between the patient (at the originating site) and provider (at the distant site) was utilized to provide this telehealth service.    Verbal consent was requested and obtained from the patient on the day of encounter.  ER Follow-up      Pt was admitted to the hospital about 1 month  ago -->SNF --> home on 11/11  Discharged home   PT is coming today  Dizziness improving over time  Taking meclizine    Review of Systems   All other systems reviewed and are negative.    Assessment/Plan     Problem List Items Addressed This Visit       Dizziness    Relevant Medications    meclizine (Antivert) 25 mg tablet

## 2024-12-08 DIAGNOSIS — I10 BENIGN ESSENTIAL HYPERTENSION: ICD-10-CM

## 2024-12-08 DIAGNOSIS — J42 CHRONIC BRONCHITIS, UNSPECIFIED CHRONIC BRONCHITIS TYPE (MULTI): ICD-10-CM

## 2024-12-08 DIAGNOSIS — I47.19 ATRIAL TACHYCARDIA (CMS-HCC): ICD-10-CM

## 2024-12-08 DIAGNOSIS — E11.9 TYPE 2 DIABETES MELLITUS WITHOUT COMPLICATION, WITHOUT LONG-TERM CURRENT USE OF INSULIN (MULTI): ICD-10-CM

## 2024-12-09 RX ORDER — GLIMEPIRIDE 1 MG/1
1 TABLET ORAL DAILY
Qty: 90 TABLET | Refills: 0 | Status: SHIPPED | OUTPATIENT
Start: 2024-12-09

## 2024-12-09 RX ORDER — METOPROLOL SUCCINATE 25 MG/1
25 TABLET, EXTENDED RELEASE ORAL DAILY
Qty: 90 TABLET | Refills: 0 | Status: SHIPPED | OUTPATIENT
Start: 2024-12-09

## 2024-12-09 RX ORDER — ALBUTEROL SULFATE 90 UG/1
INHALANT RESPIRATORY (INHALATION)
Qty: 34 G | Refills: 0 | Status: SHIPPED | OUTPATIENT
Start: 2024-12-09

## 2024-12-09 RX ORDER — LISINOPRIL AND HYDROCHLOROTHIAZIDE 12.5; 2 MG/1; MG/1
1 TABLET ORAL DAILY
Qty: 90 TABLET | Refills: 0 | Status: SHIPPED | OUTPATIENT
Start: 2024-12-09

## 2025-01-05 DIAGNOSIS — I47.19 ATRIAL TACHYCARDIA (CMS-HCC): ICD-10-CM

## 2025-01-06 ENCOUNTER — TELEPHONE (OUTPATIENT)
Dept: NEUROSURGERY | Facility: HOSPITAL | Age: 75
End: 2025-01-06
Payer: MEDICARE

## 2025-01-06 RX ORDER — CLOPIDOGREL BISULFATE 75 MG/1
75 TABLET ORAL DAILY
Qty: 90 TABLET | Refills: 0 | Status: SHIPPED | OUTPATIENT
Start: 2025-01-06

## 2025-01-06 NOTE — TELEPHONE ENCOUNTER
Left Message for follow up to schedule appointment to see Dr. Red Tariq of Neurosurgery in regards to incidentally found 2.6 mm right MCA aneurysm.   Multiple messages and attempts to reach patient for follow up.   Message left with call back phone number to the office of Dr. Tariq.

## 2025-01-09 ENCOUNTER — APPOINTMENT (OUTPATIENT)
Dept: NEUROLOGY | Facility: CLINIC | Age: 75
End: 2025-01-09
Payer: MEDICARE

## 2025-01-15 DIAGNOSIS — E78.00 PURE HYPERCHOLESTEROLEMIA: ICD-10-CM

## 2025-01-16 DIAGNOSIS — E78.5 HYPERLIPIDEMIA, UNSPECIFIED HYPERLIPIDEMIA TYPE: ICD-10-CM

## 2025-01-21 ENCOUNTER — APPOINTMENT (OUTPATIENT)
Dept: PRIMARY CARE | Facility: CLINIC | Age: 75
End: 2025-01-21
Payer: MEDICARE

## 2025-01-22 RX ORDER — ATORVASTATIN CALCIUM 80 MG/1
80 TABLET, FILM COATED ORAL NIGHTLY
Qty: 90 TABLET | Refills: 0 | Status: SHIPPED | OUTPATIENT
Start: 2025-01-22

## 2025-01-22 RX ORDER — EZETIMIBE 10 MG/1
10 TABLET ORAL DAILY
Qty: 90 TABLET | Refills: 0 | Status: SHIPPED | OUTPATIENT
Start: 2025-01-22

## 2025-01-29 ENCOUNTER — APPOINTMENT (OUTPATIENT)
Dept: PRIMARY CARE | Facility: CLINIC | Age: 75
End: 2025-01-29
Payer: MEDICARE

## 2025-02-04 ENCOUNTER — APPOINTMENT (OUTPATIENT)
Dept: NEUROSURGERY | Facility: CLINIC | Age: 75
End: 2025-02-04
Payer: MEDICARE

## 2025-02-05 ENCOUNTER — APPOINTMENT (OUTPATIENT)
Dept: PULMONOLOGY | Facility: CLINIC | Age: 75
End: 2025-02-05
Payer: MEDICARE

## 2025-02-09 DIAGNOSIS — I47.19 ATRIAL TACHYCARDIA (CMS-HCC): ICD-10-CM

## 2025-02-09 DIAGNOSIS — J42 CHRONIC BRONCHITIS, UNSPECIFIED CHRONIC BRONCHITIS TYPE (MULTI): ICD-10-CM

## 2025-02-09 DIAGNOSIS — I10 BENIGN ESSENTIAL HYPERTENSION: ICD-10-CM

## 2025-02-09 DIAGNOSIS — E11.9 TYPE 2 DIABETES MELLITUS WITHOUT COMPLICATION, WITHOUT LONG-TERM CURRENT USE OF INSULIN (MULTI): ICD-10-CM

## 2025-02-10 RX ORDER — LISINOPRIL AND HYDROCHLOROTHIAZIDE 12.5; 2 MG/1; MG/1
1 TABLET ORAL DAILY
Qty: 90 TABLET | Refills: 0 | Status: SHIPPED | OUTPATIENT
Start: 2025-02-10

## 2025-02-10 RX ORDER — ALBUTEROL SULFATE 90 UG/1
INHALANT RESPIRATORY (INHALATION)
Qty: 34 G | Refills: 0 | Status: SHIPPED | OUTPATIENT
Start: 2025-02-10

## 2025-02-10 RX ORDER — METOPROLOL SUCCINATE 25 MG/1
25 TABLET, EXTENDED RELEASE ORAL DAILY
Qty: 90 TABLET | Refills: 0 | Status: SHIPPED | OUTPATIENT
Start: 2025-02-10

## 2025-02-10 RX ORDER — GLIMEPIRIDE 1 MG/1
1 TABLET ORAL DAILY
Qty: 90 TABLET | Refills: 0 | Status: SHIPPED | OUTPATIENT
Start: 2025-02-10

## 2025-03-09 DIAGNOSIS — I47.19 ATRIAL TACHYCARDIA (CMS-HCC): ICD-10-CM

## 2025-03-10 RX ORDER — CLOPIDOGREL BISULFATE 75 MG/1
75 TABLET ORAL DAILY
Qty: 90 TABLET | Refills: 0 | Status: SHIPPED | OUTPATIENT
Start: 2025-03-10

## 2025-03-11 ENCOUNTER — OFFICE VISIT (OUTPATIENT)
Dept: NEUROSURGERY | Facility: CLINIC | Age: 75
End: 2025-03-11
Payer: MEDICARE

## 2025-03-11 VITALS
BODY MASS INDEX: 28.48 KG/M2 | SYSTOLIC BLOOD PRESSURE: 125 MMHG | RESPIRATION RATE: 18 BRPM | DIASTOLIC BLOOD PRESSURE: 68 MMHG | HEART RATE: 47 BPM | TEMPERATURE: 97.5 F | WEIGHT: 210 LBS

## 2025-03-11 DIAGNOSIS — I67.1 BRAIN ANEURYSM (HHS-HCC): ICD-10-CM

## 2025-03-11 PROCEDURE — 3074F SYST BP LT 130 MM HG: CPT | Performed by: NEUROLOGICAL SURGERY

## 2025-03-11 PROCEDURE — 99214 OFFICE O/P EST MOD 30 MIN: CPT | Performed by: NEUROLOGICAL SURGERY

## 2025-03-11 PROCEDURE — 3078F DIAST BP <80 MM HG: CPT | Performed by: NEUROLOGICAL SURGERY

## 2025-03-11 PROCEDURE — 99204 OFFICE O/P NEW MOD 45 MIN: CPT | Performed by: NEUROLOGICAL SURGERY

## 2025-03-11 PROCEDURE — 1126F AMNT PAIN NOTED NONE PRSNT: CPT | Performed by: NEUROLOGICAL SURGERY

## 2025-03-11 PROCEDURE — 1159F MED LIST DOCD IN RCRD: CPT | Performed by: NEUROLOGICAL SURGERY

## 2025-03-11 ASSESSMENT — PAIN SCALES - GENERAL: PAINLEVEL_OUTOF10: 0-NO PAIN

## 2025-03-11 NOTE — PROGRESS NOTES
Chief Complaint:   Brendan Rob is a 74 y.o. man here for incidental nonruptured cerebral aneurysm     HPI  PMHx of DM II, CKD III, HTN, Afib-not on AC, SVT, COPD, PVD who is admitted to observation for generalized weakness and dizziness. Stroke work-up including CTA head and neck showed incidental right MCA aneurysm. He is referred to me for this reason. He was discharged to SNF and is doing much better now. HE has no family history of aneurysms. He is a prior smoker - quit in 2020.     Review of Systems  All other systems reviewed and negative other than what is already stated in this note.      Past Medical History:   Diagnosis Date    Abnormal weight loss 04/25/2015    Weight loss    Acute upper respiratory infection, unspecified 01/04/2014    Acute upper respiratory infection    Atrial fibrillation (Multi)     CAD (coronary artery disease)     COPD (chronic obstructive pulmonary disease) (Multi)     Dizziness     Encounter for immunization 09/18/2020    Need for prophylactic vaccination and inoculation against influenza    Encounter for screening for malignant neoplasm of prostate 04/25/2015    Screening for prostate cancer    Encounter for screening for malignant neoplasm of prostate 06/29/2013    Encounter for screening for malignant neoplasm of prostate    HLD (hyperlipidemia)     Hypertension     Personal history of other diseases of urinary system 06/29/2013    History of acute renal failure    Personal history of other endocrine, nutritional and metabolic disease     History of high cholesterol       Patient Active Problem List   Diagnosis    Abnormal respirations    Abnormal chest CT    Anemia    Asthma    Atrial tachycardia (CMS-HCC)    Paroxysmal SVT (supraventricular tachycardia) (CMS-HCC)    Benign essential hypertension    Callus of foot    Chronic cough    Cognitive changes    Mild cognitive impairment    COPD (chronic obstructive pulmonary disease) (Multi)    COVID-19 virus infection     D-dimer, elevated    Diabetes mellitus (Multi)    PALACIOS (dyspnea on exertion)    Lower abdominal pain    Memory loss    Memory loss, short term    Depression    Mood disorder (CMS-HCC)    Organic impotence    Peripheral vascular disease (CMS-[facility])    Pure hypercholesterolemia    Right foot drop    Weakness generalized    Chronic respiratory failure    History of substance dependence (Multi)    Type 2 diabetes mellitus with diabetic peripheral angiopathy without gangrene, without long-term current use of insulin (Multi)    Stage 3a chronic kidney disease (Multi)    Dizziness    HLD (hyperlipidemia)    Atrial fibrillation (Multi)    Weakness       Past Surgical History:   Procedure Laterality Date    COLONOSCOPY  2013    Complete Colonoscopy    OTHER SURGICAL HISTORY  2019    Aortobifemoral bypass       Family History   Problem Relation Name Age of Onset    No Known Problems Mother      No Known Problems Father         Social History     Tobacco Use    Smoking status: Former     Current packs/day: 0.00     Average packs/day: 1 pack/day for 25.0 years (25.0 ttl pk-yrs)     Types: Cigarettes     Start date:      Quit date:      Years since quittin.1     Passive exposure: Past    Smokeless tobacco: Never   Substance Use Topics    Alcohol use: Not Currently     Comment: stopped in     Drug use: Not Currently     Comment: quit long time ago         Current Outpatient Medications:     albuterol 90 mcg/actuation inhaler, USE 2 INHALATIONS BY MOUTH EVERY 6 HOURS AS NEEDED FOR WHEEZING, Disp: 34 g, Rfl: 0    atorvastatin (Lipitor) 80 mg tablet, TAKE 1 TABLET BY MOUTH ONCE  DAILY AT BEDTIME, Disp: 90 tablet, Rfl: 0    clopidogrel (Plavix) 75 mg tablet, TAKE 1 TABLET BY MOUTH ONCE  DAILY, Disp: 90 tablet, Rfl: 0    ezetimibe (Zetia) 10 mg tablet, TAKE 1 TABLET BY MOUTH ONCE  DAILY, Disp: 90 tablet, Rfl: 0    glimepiride (Amaryl) 1 mg tablet, TAKE 1 TABLET BY MOUTH ONCE  DAILY, Disp: 90 tablet, Rfl: 0     lisinopriL-hydrochlorothiazide 20-12.5 mg tablet, TAKE 1 TABLET BY MOUTH ONCE  DAILY, Disp: 90 tablet, Rfl: 0    meclizine (Antivert) 25 mg tablet, Take 1 tablet (25 mg) by mouth 3 times a day as needed for dizziness., Disp: 90 tablet, Rfl: 1    metoprolol succinate XL (Toprol-XL) 25 mg 24 hr tablet, TAKE 1 TABLET BY MOUTH ONCE  DAILY DO NOT CRUSH OR CHEW, Disp: 90 tablet, Rfl: 0    blood sugar diagnostic (Blood Glucose Test) strip, USE AS DIRECTED. (Patient not taking: Reported on 3/11/2025), Disp: 30 strip, Rfl: 2        Objective   Vitals:    03/11/25 1346   BP: 125/68   Pulse: (!) 47   Resp: 18   Temp: 36.4 °C (97.5 °F)       no acute distress, well developed man appearing his  stated age  normal sclera  moist mucus membranes  no peripheral edema   symmetric chest rise  nondistended abdomen  alert and oriented, pupils equal and round, extraocular movements intact, somewhat deconditioned exam, but grossly full strength in all extremities without focal motor deficit, normal sensation to light touch throughout, reduced but symmetric reflexes, no dysmetria  normal mood      Assessment/Plan   I personally reviewed CT angiogram of the head and neck done on 10/23/2024 which shows a right M1 segment middle cerebral artery outpouching measuring 3 mm.  This is either a cerebral aneurysm versus infundibulum.  I explained that there is very low risk of rupture/aneurysmal subarachnoid hemorrhage, but not 0%.  I recommended observation and we will get MRA head without contrast for follow-up in 1 year from last scan (October of this year).      Red Tariq MD

## 2025-03-20 DIAGNOSIS — E78.5 HYPERLIPIDEMIA, UNSPECIFIED HYPERLIPIDEMIA TYPE: ICD-10-CM

## 2025-03-20 DIAGNOSIS — E78.00 PURE HYPERCHOLESTEROLEMIA: ICD-10-CM

## 2025-03-20 RX ORDER — ATORVASTATIN CALCIUM 80 MG/1
80 TABLET, FILM COATED ORAL NIGHTLY
Qty: 90 TABLET | Refills: 3 | Status: SHIPPED | OUTPATIENT
Start: 2025-03-20

## 2025-03-20 RX ORDER — EZETIMIBE 10 MG/1
10 TABLET ORAL DAILY
Qty: 90 TABLET | Refills: 3 | Status: SHIPPED | OUTPATIENT
Start: 2025-03-20

## 2025-04-13 DIAGNOSIS — I10 BENIGN ESSENTIAL HYPERTENSION: ICD-10-CM

## 2025-04-13 DIAGNOSIS — J42 CHRONIC BRONCHITIS, UNSPECIFIED CHRONIC BRONCHITIS TYPE (MULTI): ICD-10-CM

## 2025-04-13 DIAGNOSIS — E11.9 TYPE 2 DIABETES MELLITUS WITHOUT COMPLICATION, WITHOUT LONG-TERM CURRENT USE OF INSULIN: ICD-10-CM

## 2025-04-13 DIAGNOSIS — I47.19 ATRIAL TACHYCARDIA (CMS-HCC): ICD-10-CM

## 2025-04-14 RX ORDER — LISINOPRIL AND HYDROCHLOROTHIAZIDE 12.5; 2 MG/1; MG/1
1 TABLET ORAL DAILY
Qty: 90 TABLET | Refills: 3 | Status: SHIPPED | OUTPATIENT
Start: 2025-04-14

## 2025-04-14 RX ORDER — ALBUTEROL SULFATE 90 UG/1
INHALANT RESPIRATORY (INHALATION)
Qty: 34 G | Refills: 3 | Status: SHIPPED | OUTPATIENT
Start: 2025-04-14

## 2025-04-14 RX ORDER — GLIMEPIRIDE 1 MG/1
1 TABLET ORAL DAILY
Qty: 90 TABLET | Refills: 3 | Status: SHIPPED | OUTPATIENT
Start: 2025-04-14

## 2025-04-14 RX ORDER — METOPROLOL SUCCINATE 25 MG/1
25 TABLET, EXTENDED RELEASE ORAL DAILY
Qty: 90 TABLET | Refills: 3 | Status: SHIPPED | OUTPATIENT
Start: 2025-04-14

## 2025-05-11 DIAGNOSIS — I47.19 ATRIAL TACHYCARDIA: ICD-10-CM

## 2025-05-12 RX ORDER — CLOPIDOGREL BISULFATE 75 MG/1
75 TABLET ORAL DAILY
Qty: 90 TABLET | Refills: 0 | Status: SHIPPED | OUTPATIENT
Start: 2025-05-12

## 2025-05-14 ENCOUNTER — APPOINTMENT (OUTPATIENT)
Dept: PRIMARY CARE | Facility: CLINIC | Age: 75
End: 2025-05-14
Payer: MEDICARE

## 2025-05-21 ENCOUNTER — APPOINTMENT (OUTPATIENT)
Dept: PRIMARY CARE | Facility: CLINIC | Age: 75
End: 2025-05-21
Payer: MEDICARE

## 2025-07-07 ENCOUNTER — APPOINTMENT (OUTPATIENT)
Dept: PRIMARY CARE | Facility: CLINIC | Age: 75
End: 2025-07-07
Payer: MEDICARE

## 2025-07-09 ENCOUNTER — TELEPHONE (OUTPATIENT)
Dept: PRIMARY CARE | Facility: CLINIC | Age: 75
End: 2025-07-09
Payer: MEDICARE

## 2025-07-09 NOTE — TELEPHONE ENCOUNTER
Patients wife called in to state that her  is currently in a rehabilitation facility was suppose to come home 06/28. From what she is aware her  signed a paper stating he wasn't ready to come home yet patient doesn't recall signing any papers. Patients wife asked if they can keep the patient even after the doctor discharged him to come home. Patients wife wanted to know if you had any information or if you could give her any advice on what she should do to get her  but home and get him in to the office to see his PCP.

## 2025-07-16 ENCOUNTER — APPOINTMENT (OUTPATIENT)
Dept: PRIMARY CARE | Facility: CLINIC | Age: 75
End: 2025-07-16
Payer: MEDICARE

## 2025-07-23 ENCOUNTER — DOCUMENTATION (OUTPATIENT)
Dept: HOME HEALTH SERVICES | Facility: HOME HEALTH | Age: 75
End: 2025-07-23
Payer: MEDICARE

## 2025-07-23 ENCOUNTER — TELEPHONE (OUTPATIENT)
Dept: HOME HEALTH SERVICES | Facility: HOME HEALTH | Age: 75
End: 2025-07-23
Payer: MEDICARE

## 2025-07-23 ENCOUNTER — HOME HEALTH ADMISSION (OUTPATIENT)
Dept: HOME HEALTH SERVICES | Facility: HOME HEALTH | Age: 75
End: 2025-07-23
Payer: MEDICARE

## 2025-07-23 NOTE — TELEPHONE ENCOUNTER
FYI patient was referred to [facility] for nursing and therapy upon SNF discharge however declined services when contacted for scheduling. Please ensure patient has a hospital follow up scheduled as they will not be monitored by home care.

## 2025-07-23 NOTE — HH CARE COORDINATION
Home Care received a Referral for Nursing, Physical Therapy, Occupational Therapy, and Home Health Aide. We have processed the referral for a Start of Care within 48 hours  .     If you have any questions or concerns, please feel free to contact us at [phone]. Follow the prompts, enter your five digit zip code, and you will be directed to your care team on [facility].      Yes - the patient is able to be screened

## 2025-08-06 ENCOUNTER — APPOINTMENT (OUTPATIENT)
Dept: PRIMARY CARE | Facility: CLINIC | Age: 75
End: 2025-08-06
Payer: MEDICARE

## 2025-08-06 ENCOUNTER — PATIENT OUTREACH (OUTPATIENT)
Dept: PRIMARY CARE | Facility: CLINIC | Age: 75
End: 2025-08-06

## 2025-08-06 NOTE — PROGRESS NOTES
Discharge Facility:[facility]  Discharge Diagnosis:Near syncope  Admission Date:8/2/25  Discharge Date: 8/5/25    PCP Appointment Date:8/20/25 already made patient wants to keep this appt  Specialist Appointment Date:   Hospital Encounter and Summary Linked: Yes/Hospital Encounter   See discharge assessment below for further details  Wrap Up  Wrap Up Additional Comments: discused discharge patient stated that he is improving provided contact information encouraged call with questions. (8/6/2025 11:17 AM)    Engagement  Call Start Time: 1117 (8/6/2025 11:17 AM)    Medications  Medications reviewed with patient/caregiver?: Yes (no new meds) (8/6/2025 11:17 AM)  Is the patient having any side effects they believe may be caused by any medication additions or changes?: No (8/6/2025 11:17 AM)  Does the patient have all medications ordered at discharge?: Yes (8/6/2025 11:17 AM)  Is the patient taking all medications as directed (includes completed medication regime)?: Yes (8/6/2025 11:17 AM)    Appointments  Does the patient have a primary care provider?: Yes (8/6/2025 11:17 AM)  Care Management Interventions: Verified appointment date/time/provider (8/6/2025 11:17 AM)  Has the patient kept scheduled appointments due by today?: Yes (8/6/2025 11:17 AM)    Self Management  Has home health visited the patient within 72 hours of discharge?: Not applicable (8/6/2025 11:17 AM)  Has all Durable Medical Equipment (DME) been delivered?: No (8/6/2025 11:17 AM)    Patient Teaching  Does the patient have access to their discharge instructions?: Yes (8/6/2025 11:17 AM)  Care Management Interventions: Reviewed instructions with patient (8/6/2025 11:17 AM)  What is the patient's perception of their health status since discharge?: Improving (8/6/2025 11:17 AM)  Is the patient/caregiver able to teach back the hierarchy of who to call/visit for symptoms/problems? PCP, Specialist, Home Health nurse, Urgent Care, ED, 911: Yes (8/6/2025 11:17  AM)

## 2025-08-20 ENCOUNTER — APPOINTMENT (OUTPATIENT)
Dept: PRIMARY CARE | Facility: CLINIC | Age: 75
End: 2025-08-20
Payer: MEDICARE

## 2025-08-21 ENCOUNTER — PATIENT OUTREACH (OUTPATIENT)
Dept: PRIMARY CARE | Facility: CLINIC | Age: 75
End: 2025-08-21
Payer: MEDICARE

## 2025-08-28 DIAGNOSIS — I47.19 ATRIAL TACHYCARDIA: ICD-10-CM

## 2025-08-28 RX ORDER — CLOPIDOGREL BISULFATE 75 MG/1
75 TABLET ORAL DAILY
Qty: 90 TABLET | Refills: 0 | Status: SHIPPED | OUTPATIENT
Start: 2025-08-28

## 2025-09-02 ENCOUNTER — PATIENT OUTREACH (OUTPATIENT)
Dept: PRIMARY CARE | Facility: CLINIC | Age: 75
End: 2025-09-02
Payer: MEDICARE

## 2025-09-22 ENCOUNTER — APPOINTMENT (OUTPATIENT)
Dept: PRIMARY CARE | Facility: CLINIC | Age: 75
End: 2025-09-22
Payer: MEDICARE